# Patient Record
Sex: FEMALE | Race: WHITE | NOT HISPANIC OR LATINO | Employment: FULL TIME | URBAN - METROPOLITAN AREA
[De-identification: names, ages, dates, MRNs, and addresses within clinical notes are randomized per-mention and may not be internally consistent; named-entity substitution may affect disease eponyms.]

---

## 2017-01-03 LAB — EXTERNAL GROUP B STREP ANTIGEN: NEGATIVE

## 2017-01-04 ENCOUNTER — GENERIC CONVERSION - ENCOUNTER (OUTPATIENT)
Dept: OTHER | Facility: OTHER | Age: 27
End: 2017-01-04

## 2017-01-11 ENCOUNTER — GENERIC CONVERSION - ENCOUNTER (OUTPATIENT)
Dept: OTHER | Facility: OTHER | Age: 27
End: 2017-01-11

## 2017-01-11 ENCOUNTER — HOSPITAL ENCOUNTER (INPATIENT)
Facility: HOSPITAL | Age: 27
LOS: 4 days | Discharge: HOME | End: 2017-01-15
Attending: OBSTETRICS & GYNECOLOGY | Admitting: OBSTETRICS & GYNECOLOGY
Payer: COMMERCIAL

## 2017-01-11 DIAGNOSIS — O13.3 GESTATIONAL HTN, THIRD TRIMESTER: ICD-10-CM

## 2017-01-11 PROBLEM — Z3A.38 38 WEEKS GESTATION OF PREGNANCY: Status: ACTIVE | Noted: 2017-01-11

## 2017-01-11 PROBLEM — O13.9 GESTATIONAL HTN: Status: ACTIVE | Noted: 2017-01-11

## 2017-01-11 LAB
ABO GROUP BLD: NORMAL
ALBUMIN SERPL BCP-MCNC: 2.7 G/DL (ref 3.5–5)
ALP SERPL-CCNC: 195 U/L (ref 46–116)
ALT SERPL W P-5'-P-CCNC: 28 U/L (ref 12–78)
ANION GAP SERPL CALCULATED.3IONS-SCNC: 11 MMOL/L (ref 4–13)
AST SERPL W P-5'-P-CCNC: 20 U/L (ref 5–45)
BACTERIA UR QL AUTO: ABNORMAL /HPF
BASOPHILS # BLD AUTO: 0.02 THOUSANDS/ΜL (ref 0–0.1)
BASOPHILS NFR BLD AUTO: 0 % (ref 0–1)
BILIRUB SERPL-MCNC: 0.59 MG/DL (ref 0.2–1)
BILIRUB UR QL STRIP: NEGATIVE
BLD GP AB SCN SERPL QL: NEGATIVE
BUN SERPL-MCNC: 5 MG/DL (ref 5–25)
CALCIUM SERPL-MCNC: 8.7 MG/DL (ref 8.3–10.1)
CHLORIDE SERPL-SCNC: 103 MMOL/L (ref 100–108)
CLARITY UR: CLEAR
CO2 SERPL-SCNC: 23 MMOL/L (ref 21–32)
COLOR UR: YELLOW
CREAT SERPL-MCNC: 0.41 MG/DL (ref 0.6–1.3)
CREAT UR-MCNC: 61.2 MG/DL
EOSINOPHIL # BLD AUTO: 0.06 THOUSAND/ΜL (ref 0–0.61)
EOSINOPHIL NFR BLD AUTO: 0 % (ref 0–6)
ERYTHROCYTE [DISTWIDTH] IN BLOOD BY AUTOMATED COUNT: 13.4 % (ref 11.6–15.1)
GFR SERPL CREATININE-BSD FRML MDRD: >60 ML/MIN/1.73SQ M
GLUCOSE SERPL-MCNC: 80 MG/DL (ref 65–140)
GLUCOSE UR STRIP-MCNC: NEGATIVE MG/DL
HCT VFR BLD AUTO: 40.4 % (ref 34.8–46.1)
HGB BLD-MCNC: 14 G/DL (ref 11.5–15.4)
HGB UR QL STRIP.AUTO: NEGATIVE
KETONES UR STRIP-MCNC: NEGATIVE MG/DL
LEUKOCYTE ESTERASE UR QL STRIP: ABNORMAL
LYMPHOCYTES # BLD AUTO: 1.94 THOUSANDS/ΜL (ref 0.6–4.47)
LYMPHOCYTES NFR BLD AUTO: 14 % (ref 14–44)
MCH RBC QN AUTO: 29 PG (ref 26.8–34.3)
MCHC RBC AUTO-ENTMCNC: 34.7 G/DL (ref 31.4–37.4)
MCV RBC AUTO: 84 FL (ref 82–98)
MONOCYTES # BLD AUTO: 0.74 THOUSAND/ΜL (ref 0.17–1.22)
MONOCYTES NFR BLD AUTO: 5 % (ref 4–12)
NEUTROPHILS # BLD AUTO: 11.12 THOUSANDS/ΜL (ref 1.85–7.62)
NEUTS SEG NFR BLD AUTO: 81 % (ref 43–75)
NITRITE UR QL STRIP: NEGATIVE
NON-SQ EPI CELLS URNS QL MICRO: ABNORMAL /HPF
NRBC BLD AUTO-RTO: 0 /100 WBCS
PH UR STRIP.AUTO: 8 [PH] (ref 4.5–8)
PLATELET # BLD AUTO: 206 THOUSANDS/UL (ref 149–390)
PMV BLD AUTO: 10.3 FL (ref 8.9–12.7)
POTASSIUM SERPL-SCNC: 4.1 MMOL/L (ref 3.5–5.3)
PROT SERPL-MCNC: 6.7 G/DL (ref 6.4–8.2)
PROT UR STRIP-MCNC: NEGATIVE MG/DL
PROT UR-MCNC: 17 MG/DL
PROT/CREAT UR: 0.28 MG/G{CREAT} (ref 0–0.1)
RBC # BLD AUTO: 4.83 MILLION/UL (ref 3.81–5.12)
RBC #/AREA URNS AUTO: ABNORMAL /HPF
RH BLD: POSITIVE
SODIUM SERPL-SCNC: 137 MMOL/L (ref 136–145)
SP GR UR STRIP.AUTO: 1.02 (ref 1–1.03)
URATE SERPL-MCNC: 3.4 MG/DL (ref 2–6.8)
UROBILINOGEN UR QL STRIP.AUTO: 0.2 E.U./DL
WBC # BLD AUTO: 13.88 THOUSAND/UL (ref 4.31–10.16)
WBC #/AREA URNS AUTO: ABNORMAL /HPF

## 2017-01-11 PROCEDURE — 85025 COMPLETE CBC W/AUTO DIFF WBC: CPT | Performed by: OBSTETRICS & GYNECOLOGY

## 2017-01-11 PROCEDURE — 86900 BLOOD TYPING SEROLOGIC ABO: CPT | Performed by: OBSTETRICS & GYNECOLOGY

## 2017-01-11 PROCEDURE — 86850 RBC ANTIBODY SCREEN: CPT | Performed by: OBSTETRICS & GYNECOLOGY

## 2017-01-11 PROCEDURE — 82570 ASSAY OF URINE CREATININE: CPT | Performed by: OBSTETRICS & GYNECOLOGY

## 2017-01-11 PROCEDURE — 81001 URINALYSIS AUTO W/SCOPE: CPT | Performed by: OBSTETRICS & GYNECOLOGY

## 2017-01-11 PROCEDURE — 86592 SYPHILIS TEST NON-TREP QUAL: CPT | Performed by: OBSTETRICS & GYNECOLOGY

## 2017-01-11 PROCEDURE — 80053 COMPREHEN METABOLIC PANEL: CPT | Performed by: OBSTETRICS & GYNECOLOGY

## 2017-01-11 PROCEDURE — 3E033VJ INTRODUCTION OF OTHER HORMONE INTO PERIPHERAL VEIN, PERCUTANEOUS APPROACH: ICD-10-PCS | Performed by: OBSTETRICS & GYNECOLOGY

## 2017-01-11 PROCEDURE — 99203 OFFICE O/P NEW LOW 30 MIN: CPT

## 2017-01-11 PROCEDURE — 86901 BLOOD TYPING SEROLOGIC RH(D): CPT | Performed by: OBSTETRICS & GYNECOLOGY

## 2017-01-11 PROCEDURE — 4A1HXCZ MONITORING OF PRODUCTS OF CONCEPTION, CARDIAC RATE, EXTERNAL APPROACH: ICD-10-PCS | Performed by: OBSTETRICS & GYNECOLOGY

## 2017-01-11 PROCEDURE — 84550 ASSAY OF BLOOD/URIC ACID: CPT | Performed by: OBSTETRICS & GYNECOLOGY

## 2017-01-11 PROCEDURE — 84156 ASSAY OF PROTEIN URINE: CPT | Performed by: OBSTETRICS & GYNECOLOGY

## 2017-01-11 RX ORDER — OXYTOCIN/RINGER'S LACTATE 30/500 ML
1-30 PLASTIC BAG, INJECTION (ML) INTRAVENOUS
Status: DISCONTINUED | OUTPATIENT
Start: 2017-01-11 | End: 2017-01-13

## 2017-01-11 RX ADMIN — MISOPROSTOL 25 MCG: 100 TABLET ORAL at 15:13

## 2017-01-11 RX ADMIN — Medication 2 MILLI-UNITS/MIN: at 22:36

## 2017-01-12 LAB — RPR SER QL: NORMAL

## 2017-01-12 RX ORDER — BUTORPHANOL TARTRATE 1 MG/ML
1 INJECTION, SOLUTION INTRAMUSCULAR; INTRAVENOUS ONCE
Status: COMPLETED | OUTPATIENT
Start: 2017-01-12 | End: 2017-01-12

## 2017-01-12 RX ORDER — ACETAMINOPHEN 325 MG/1
650 TABLET ORAL EVERY 6 HOURS PRN
Status: DISCONTINUED | OUTPATIENT
Start: 2017-01-12 | End: 2017-01-13

## 2017-01-12 RX ORDER — SODIUM CHLORIDE, SODIUM LACTATE, POTASSIUM CHLORIDE, CALCIUM CHLORIDE 600; 310; 30; 20 MG/100ML; MG/100ML; MG/100ML; MG/100ML
125 INJECTION, SOLUTION INTRAVENOUS CONTINUOUS
Status: DISCONTINUED | OUTPATIENT
Start: 2017-01-12 | End: 2017-01-13

## 2017-01-12 RX ADMIN — ACETAMINOPHEN 650 MG: 325 TABLET, FILM COATED ORAL at 09:07

## 2017-01-12 RX ADMIN — SODIUM CHLORIDE, SODIUM LACTATE, POTASSIUM CHLORIDE, AND CALCIUM CHLORIDE 125 ML/HR: .6; .31; .03; .02 INJECTION, SOLUTION INTRAVENOUS at 03:40

## 2017-01-12 RX ADMIN — BUTORPHANOL TARTRATE 1 MG: 1 INJECTION, SOLUTION INTRAMUSCULAR; INTRAVENOUS at 14:32

## 2017-01-12 RX ADMIN — SODIUM CHLORIDE, SODIUM LACTATE, POTASSIUM CHLORIDE, AND CALCIUM CHLORIDE 125 ML/HR: .6; .31; .03; .02 INJECTION, SOLUTION INTRAVENOUS at 14:35

## 2017-01-12 RX ADMIN — SODIUM CHLORIDE, SODIUM LACTATE, POTASSIUM CHLORIDE, AND CALCIUM CHLORIDE 125 ML/HR: .6; .31; .03; .02 INJECTION, SOLUTION INTRAVENOUS at 06:25

## 2017-01-13 ENCOUNTER — ANESTHESIA EVENT (INPATIENT)
Dept: LABOR AND DELIVERY | Facility: HOSPITAL | Age: 27
End: 2017-01-13
Payer: COMMERCIAL

## 2017-01-13 ENCOUNTER — ANESTHESIA (INPATIENT)
Dept: LABOR AND DELIVERY | Facility: HOSPITAL | Age: 27
End: 2017-01-13
Payer: COMMERCIAL

## 2017-01-13 PROBLEM — Z3A.38 38 WEEKS GESTATION OF PREGNANCY: Status: RESOLVED | Noted: 2017-01-11 | Resolved: 2017-01-13

## 2017-01-13 LAB
BASE EXCESS BLDCOA CALC-SCNC: -8.5 MMOL/L (ref 3–11)
BASE EXCESS BLDCOV CALC-SCNC: -5.5 MMOL/L (ref 1–9)
HCO3 BLDCOA-SCNC: 19.1 MMOL/L (ref 17.3–27.3)
HCO3 BLDCOV-SCNC: 20.9 MMOL/L (ref 12.2–28.6)
O2 CT VFR BLDCOA CALC: 13.2 ML/DL
OXYHGB MFR BLDCOA: 59.2 %
OXYHGB MFR BLDCOV: 70.9 %
PCO2 BLDCOA: 46.7 MM[HG] (ref 30–60)
PCO2 BLDCOV: 44.1 MM HG (ref 27–43)
PH BLDCOA: 7.23 [PH] (ref 7.23–7.43)
PH BLDCOV: 7.29 [PH] (ref 7.19–7.49)
PO2 BLDCOA: 29 MM HG (ref 5–25)
PO2 BLDCOV: 32.9 MM HG (ref 15–45)
SAO2 % BLDCOV: 15.9 ML/DL

## 2017-01-13 PROCEDURE — 82805 BLOOD GASES W/O2 SATURATION: CPT | Performed by: OBSTETRICS & GYNECOLOGY

## 2017-01-13 PROCEDURE — 10907ZC DRAINAGE OF AMNIOTIC FLUID, THERAPEUTIC FROM PRODUCTS OF CONCEPTION, VIA NATURAL OR ARTIFICIAL OPENING: ICD-10-PCS | Performed by: OBSTETRICS & GYNECOLOGY

## 2017-01-13 PROCEDURE — 0KQM0ZZ REPAIR PERINEUM MUSCLE, OPEN APPROACH: ICD-10-PCS | Performed by: OBSTETRICS & GYNECOLOGY

## 2017-01-13 RX ORDER — SIMETHICONE 80 MG
80 TABLET,CHEWABLE ORAL 4 TIMES DAILY PRN
Status: DISCONTINUED | OUTPATIENT
Start: 2017-01-13 | End: 2017-01-15 | Stop reason: HOSPADM

## 2017-01-13 RX ORDER — BUTORPHANOL TARTRATE 1 MG/ML
1 INJECTION, SOLUTION INTRAMUSCULAR; INTRAVENOUS ONCE
Status: COMPLETED | OUTPATIENT
Start: 2017-01-13 | End: 2017-01-13

## 2017-01-13 RX ORDER — DOCUSATE SODIUM 100 MG/1
100 CAPSULE, LIQUID FILLED ORAL 2 TIMES DAILY
Status: DISCONTINUED | OUTPATIENT
Start: 2017-01-13 | End: 2017-01-15 | Stop reason: HOSPADM

## 2017-01-13 RX ORDER — PROMETHAZINE HYDROCHLORIDE 25 MG/ML
12.5 INJECTION, SOLUTION INTRAMUSCULAR; INTRAVENOUS ONCE
Status: COMPLETED | OUTPATIENT
Start: 2017-01-13 | End: 2017-01-13

## 2017-01-13 RX ORDER — OXYCODONE HYDROCHLORIDE AND ACETAMINOPHEN 5; 325 MG/1; MG/1
1 TABLET ORAL EVERY 4 HOURS PRN
Status: DISCONTINUED | OUTPATIENT
Start: 2017-01-13 | End: 2017-01-15 | Stop reason: HOSPADM

## 2017-01-13 RX ORDER — BISACODYL 10 MG
10 SUPPOSITORY, RECTAL RECTAL AS NEEDED
Status: DISCONTINUED | OUTPATIENT
Start: 2017-01-13 | End: 2017-01-15 | Stop reason: HOSPADM

## 2017-01-13 RX ORDER — IBUPROFEN 600 MG/1
600 TABLET ORAL EVERY 6 HOURS PRN
Status: DISCONTINUED | OUTPATIENT
Start: 2017-01-13 | End: 2017-01-15 | Stop reason: HOSPADM

## 2017-01-13 RX ORDER — ACETAMINOPHEN 325 MG/1
650 TABLET ORAL EVERY 4 HOURS PRN
Status: DISCONTINUED | OUTPATIENT
Start: 2017-01-13 | End: 2017-01-15 | Stop reason: HOSPADM

## 2017-01-13 RX ORDER — OXYCODONE HYDROCHLORIDE AND ACETAMINOPHEN 5; 325 MG/1; MG/1
2 TABLET ORAL EVERY 4 HOURS PRN
Status: DISCONTINUED | OUTPATIENT
Start: 2017-01-13 | End: 2017-01-15 | Stop reason: HOSPADM

## 2017-01-13 RX ORDER — OXYTOCIN/RINGER'S LACTATE 30/500 ML
250 PLASTIC BAG, INJECTION (ML) INTRAVENOUS CONTINUOUS
Status: ACTIVE | OUTPATIENT
Start: 2017-01-13 | End: 2017-01-13

## 2017-01-13 RX ORDER — DIPHENHYDRAMINE HCL 25 MG
25 TABLET ORAL EVERY 6 HOURS PRN
Status: DISCONTINUED | OUTPATIENT
Start: 2017-01-13 | End: 2017-01-15 | Stop reason: HOSPADM

## 2017-01-13 RX ORDER — DIAPER,BRIEF,INFANT-TODD,DISP
1 EACH MISCELLANEOUS AS NEEDED
Status: DISCONTINUED | OUTPATIENT
Start: 2017-01-13 | End: 2017-01-15 | Stop reason: HOSPADM

## 2017-01-13 RX ORDER — ROPIVACAINE HYDROCHLORIDE 2 MG/ML
INJECTION, SOLUTION EPIDURAL; INFILTRATION; PERINEURAL AS NEEDED
Status: DISCONTINUED | OUTPATIENT
Start: 2017-01-13 | End: 2017-01-13 | Stop reason: SURG

## 2017-01-13 RX ADMIN — WITCH HAZEL 1 PAD: 500 SOLUTION RECTAL; TOPICAL at 21:06

## 2017-01-13 RX ADMIN — BENZOCAINE AND MENTHOL: 20; .5 SPRAY TOPICAL at 21:06

## 2017-01-13 RX ADMIN — PROMETHAZINE HYDROCHLORIDE 12.5 MG: 25 INJECTION INTRAMUSCULAR; INTRAVENOUS at 11:15

## 2017-01-13 RX ADMIN — IBUPROFEN 600 MG: 600 TABLET, FILM COATED ORAL at 22:33

## 2017-01-13 RX ADMIN — BUTORPHANOL TARTRATE 1 MG: 1 INJECTION, SOLUTION INTRAMUSCULAR; INTRAVENOUS at 11:15

## 2017-01-13 RX ADMIN — SODIUM CHLORIDE, SODIUM LACTATE, POTASSIUM CHLORIDE, AND CALCIUM CHLORIDE 125 ML/HR: .6; .31; .03; .02 INJECTION, SOLUTION INTRAVENOUS at 07:40

## 2017-01-13 RX ADMIN — ROPIVACAINE HYDROCHLORIDE 5 ML: 2 INJECTION, SOLUTION EPIDURAL; INFILTRATION at 13:38

## 2017-01-13 RX ADMIN — Medication 250 MILLI-UNITS/MIN: at 17:28

## 2017-01-13 RX ADMIN — ROPIVACAINE HYDROCHLORIDE 5 ML: 2 INJECTION, SOLUTION EPIDURAL; INFILTRATION at 13:40

## 2017-01-13 RX ADMIN — Medication: at 13:30

## 2017-01-13 RX ADMIN — SODIUM CHLORIDE, SODIUM LACTATE, POTASSIUM CHLORIDE, AND CALCIUM CHLORIDE 999 ML/HR: .6; .31; .03; .02 INJECTION, SOLUTION INTRAVENOUS at 13:19

## 2017-01-14 RX ADMIN — IBUPROFEN 600 MG: 600 TABLET, FILM COATED ORAL at 05:56

## 2017-01-14 RX ADMIN — DOCUSATE SODIUM 100 MG: 100 CAPSULE, LIQUID FILLED ORAL at 18:21

## 2017-01-14 RX ADMIN — IBUPROFEN 600 MG: 600 TABLET, FILM COATED ORAL at 23:24

## 2017-01-14 RX ADMIN — IBUPROFEN 600 MG: 600 TABLET, FILM COATED ORAL at 12:31

## 2017-01-14 RX ADMIN — DOCUSATE SODIUM 100 MG: 100 CAPSULE, LIQUID FILLED ORAL at 12:31

## 2017-01-15 VITALS
HEART RATE: 95 BPM | BODY MASS INDEX: 34.16 KG/M2 | SYSTOLIC BLOOD PRESSURE: 126 MMHG | DIASTOLIC BLOOD PRESSURE: 92 MMHG | RESPIRATION RATE: 18 BRPM | WEIGHT: 174 LBS | HEIGHT: 60 IN | TEMPERATURE: 97.2 F

## 2017-01-15 PROCEDURE — 90707 MMR VACCINE SC: CPT | Performed by: SPECIALIST

## 2017-01-15 RX ORDER — ACETAMINOPHEN 325 MG/1
650 TABLET ORAL EVERY 4 HOURS PRN
Qty: 30 TABLET | Refills: 0
Start: 2017-01-15 | End: 2018-11-27

## 2017-01-15 RX ORDER — DIAPER,BRIEF,INFANT-TODD,DISP
1 EACH MISCELLANEOUS AS NEEDED
Qty: 30 G | Refills: 0
Start: 2017-01-15 | End: 2018-11-27

## 2017-01-15 RX ORDER — DOCUSATE SODIUM 100 MG/1
100 CAPSULE, LIQUID FILLED ORAL 2 TIMES DAILY
Qty: 60 CAPSULE | Refills: 0
Start: 2017-01-15 | End: 2018-11-27

## 2017-01-15 RX ORDER — IBUPROFEN 600 MG/1
600 TABLET ORAL EVERY 6 HOURS PRN
Qty: 30 TABLET | Refills: 0
Start: 2017-01-15 | End: 2018-11-27

## 2017-01-15 RX ADMIN — MEASLES, MUMPS, AND RUBELLA VIRUS VACCINE LIVE 0.5 ML: 1000; 12500; 1000 INJECTION, POWDER, LYOPHILIZED, FOR SUSPENSION SUBCUTANEOUS at 08:34

## 2017-01-15 RX ADMIN — DOCUSATE SODIUM 100 MG: 100 CAPSULE, LIQUID FILLED ORAL at 08:33

## 2017-01-23 LAB — PLACENTA IN STORAGE: NORMAL

## 2017-02-24 ENCOUNTER — GENERIC CONVERSION - ENCOUNTER (OUTPATIENT)
Dept: OTHER | Facility: OTHER | Age: 27
End: 2017-02-24

## 2017-02-24 ENCOUNTER — ALLSCRIPTS OFFICE VISIT (OUTPATIENT)
Dept: OTHER | Facility: OTHER | Age: 27
End: 2017-02-24

## 2017-05-31 ENCOUNTER — ALLSCRIPTS OFFICE VISIT (OUTPATIENT)
Dept: OTHER | Facility: OTHER | Age: 27
End: 2017-05-31

## 2018-01-09 NOTE — RESULT NOTES
Verified Results  Urine HCG- POC [id number] 09:12AM Pauline Etienne     Test Name Result Flag Reference   Urine HCG Positive         Plan  Suppression of menstruation    · Urine HCG- POC; Status:Complete;   Done: [id number] 09:12AM

## 2018-01-12 NOTE — RESULT NOTES
Verified Results  (Q) STEPWISE, PART 1 [id number] 08:36AM Minerva Álvarez     Test Name Result Flag Reference   INTERPRETATION SEE NOTE     This patient's risk does not exceed the first trimester  cut-off for Down syndrome or trisomy 18  The integrated  screen calculation is awaiting the second trimester sample  NT WAS USED IN THE RISK CALCULATIONS  Thank you for submitting this patient's Part 1 specimen  These first trimester values will be incorporated with the  second trimester values as part of the integrated testing  process  Please submit the Part 2 specimen between   08/02/2016-09/26/2016 (15 0 and 22 9 weeks gestation) with   08/02/2016-08/15/2016 (15 0 - 16 9 weeks gestation) being  optimal  When submitting Part 2, please include the  following Specimen # from Part 1:  [id number]   AGE RISK DOWN SYNDROME 1:670     DAIN DOWN SYNDROME RISK <1:5000  <1:50   RISK FOR TRISOMY 18 <1:5000  <1:100   CALCULATED GESTATIONAL$AGE 13 6     Crown rump length (CRL) was used to calculate gestational  age  MORRIS, if provided, was not used for gestational age  dating  SANCHEZ-A 740 ng/mL     SANCHEZ-A MOM 0 85     HCG 42 7 IU/mL     HCG MOM 0 57     NT MOM 1 35     The maternal serum screening results indicate a lower risk  of trisomy 21 in this pregnancy  The nasal bone was assessed  via ultrasound and was present  The combined risk is  therefore likely to be less than the calculated risk  Other  findings later in the pregnancy may change the risk  Nasal bone assessment is best accomplished through a fetal  ultrasound performed between 11 weeks 0 days through 13  weeks 6 days   In assessing the risk for aneuploidy, the  evaluation of the maternal serum markers plus the nuchal  thickness measurement is calculated first  Any potential  change to the patient's risk for aneuploidy depends on the  nuchal thickness, crown-rump length, and the ethnic origin,  and therefore the values generated by the algorithm itself  will not change  Additional information about the assessment  of the fetal nasal bone may be found on the [facility] website at  http://www  fetalmedicine com/fmf/training-certification/cert  wvztfmww-ht-tcanu  tenmartha/11-13-week-scan/assessment-of-the-nasal-bone/  Please note that the Sequential Integrated maternal serum  screen for Down syndrome risk assessment was designed by Dr Enedelia Richardson ([address], et al J Med Screen 2003 v10 p56-104)  to provide a high detection rate and low false positive rate  when a cutoff of 1:50 is used to identify high risk  pregnancies during the first trimester  Use of any other  cutoff for determination of risk in the first trimester will  result in a higher false positive rate for the two-part  screen  All patients whose risk is lower than 1:50 should  have a second sample submitted to complete the screen  This is a screening test, not a diagnostic test      This risk assessment is based on demographic data provided  by the ordering physician  Please notify the laboratory  promptly if any data are incorrect  If you have questions concerning this report: For clinical consultation, call [phone]; For technical questions, call [phone] ext [phone]; For recalculations, fax to [phone]  This test was developed and its analytical performance  characteristics have been determined by [facility]  It has not been  cleared or approved by FDA  This assay has been validated  pursuant to the CLIA regulations and is used for clinical  purposes  For additional information, please refer to  http://education  Popcuts/faq/FAQ85  (This link is provided for informational/educational  purposes only )   REFERRING PHYSICIAN NAME Rogelio Madisonsantinochad 984 PHONE [phone]     REFERRING PHYSICIAN NPMITCH [phone]     DATE OF BIRTH 1990     COLLECTION DATE 07/23/2016     MATERNAL WEIGHT 153 lbs     EST'D DATE OF DELIVERY 01/23/2017     MORRIS DETERMINED BY LMP     MOTHER'S ETHNIC ORIGIN      NUMBER OF FETUSES 1     INSULIN-DEPEND DIABETIC NO     REPEAT SPECIMEN NO     HX OF NEURAL TUBE DEFECTS NO     HISTORY OF DOWN SYNDROME NO     DONOR EGG NO     Donor Age: Egg Retrieval NOT GIVEN     ULTRASOUND DATE 07/21/2016     ULTRASONOGRAPHER'S NAME LEV JACOBS     NTQR ULTRASONOGRAPHER ID# [id number]     NTQR LOCATION ID# NOT GIVEN     NTQR READING PHYS ID# [id number]     [facility] ULTRASONOGRAPHER ID# NOT GIVEN     CROWN RUMP LENGTH 74 mm     NUCHAL TRANSLUCENCY 2 2 mm     Nasal Bone PRESENT     IF TWINS NOT GIVEN     TWIN B CRL NOT GIVEN mm     TWIN B NT NOT GIVEN mm     Twin B Nasal Bone NOT GIVEN

## 2018-01-13 VITALS
DIASTOLIC BLOOD PRESSURE: 78 MMHG | BODY MASS INDEX: 30.02 KG/M2 | HEIGHT: 61 IN | WEIGHT: 159 LBS | SYSTOLIC BLOOD PRESSURE: 120 MMHG

## 2018-01-13 NOTE — RESULT NOTES
Verified Results  (Q) STEPWISE, PART 2 [id number] 08:38AM Clay Álvarez Sat   REPORT COMMENT:  FASTING:NO     Test Name Result Flag Reference   INTERPRETATION SEE NOTE     SCREEN NEGATIVE FOR OPEN NTD, DOWN SYNDROME AND TRISOMY 18   NT WAS USED IN THE RISK CALCULATIONS  RISK FOR ONTD <1:5000     AGE RISK DOWN SYNDROME 1:900     DAIN DOWN SYNDROME RISK <1:5000  <1:270   RISK FOR TRISOMY 18 <1:5000  <1:100   CALCULATED GESTATIONAL$AGE 17 6     Crown rump length (CRL) was used to calculate gestational  age  MORRIS, if provided, was not used for gestational age  dating  AFP, SERUM 30 1 ng/mL     AFP MOM 0 76     Reference Range:                                        <2 50                                        IDD        <1 90                                        TWINS      <4 00                                        TWINS IDD  <3 50                                        TRIPLETS   <4 50   HCG, SERUM 16 4 IU/mL     HCG MOM 0 64     ESTRIOL, FREE 0 91 ng/mL     ESTRIOL MOM 0 75     INHIBIN A, DIMERIC 148 pg/mL     INHIBIN A MOM 0 91     SANCHEZ A 740 ng/mL     This test was performed using a kit that has not been  cleared or approved by the FDA  The analytical performance  characteristics of this test have been determined by [facility]  This test  should not be used for diagnosis without confirmation by  other medically established means  SANCHEZ-A MOM 0 85     NT MOM 1 35     The maternal serum screening results indicate a lower risk  of trisomy 21 in this pregnancy  The nasal bone was assessed  via ultrasound and was present  The combined risk is  therefore likely to be less than the calculated risk  Other  findings later in the pregnancy may change the risk  Nasal bone assessment is best accomplished through a fetal  ultrasound performed between 11 weeks 0 days through 13  weeks 6 days   In assessing the risk for aneuploidy, the  evaluation of the maternal serum markers plus the nuchal  thickness measurement is calculated first  Any potential  change to the patient's risk for aneuploidy depends on the  nuchal thickness, crown-rump length, and the ethnic origin,  and therefore the values generated by the algorithm itself  will not change  Additional information about the assessment  of the fetal nasal bone may be found on the [facility] website at  http://www  fetalmedicine "[employer]/fmf/training-certification/cert  ymftkekp-ty-uozvo  tence/11-13-week-scan/assessment-of-the-nasal-bone/  The Sequential Integrated Screen combines SANCHEZ-A and hCG  with or without a nuchal translucency measurement in the  first trimester with AFP, unconjugated estriol, intact hCG  and Inhibin A in the second trimester  This provides a  useful screening test for detection of open neural tube  defects, Down syndrome and Trisomy 18  It should be noted  that normal results can never guarantee the birth of a  normal baby and that 2 to 3 percent of newborns have some  type of physical or mental defect, many of which are  undetectable through any known prenatal diagnostic  technique  This is a screening test, not a diagnostic test      This risk assessment is based on demographic data provided  by the ordering physician  Please notify the laboratory  promptly if any data are incorrect  If you have questions concerning this report: For clinical consultation, call [phone]; For technical questions, call [phone] ext [phone]; For recalculations, fax to [phone]     REFERRING PHYSICIAN NAME Jojo Soriano     REFERRING PHYSICIAN PHONE [phone]     REFERRING PHYSICIAN ALONSO [phone]     SPECIMEN # FROM PART 1 [id number]     DATE OF BIRTH 1990     COLLECTION DATE 08/20/2016     MATERNAL WEIGHT 153 lbs     MORRIS: 01/23/2017     NUCHAL TRANSLUCENCY 2 2 mm     Prosper Rump Length 74 mm     Ultrasound Date 07/21/2016     Nasal Bone PRESENT     MOTHER'S ETHNIC ORIGIN  INSULIN DEPEND DIABETIC NO     REPEAT SPECIMEN NO     NUMBER OF FETUSES 1     HX OF NEURAL TUBE DEFECTS NO     Twin B Nasal Bone NOT GIVEN     For additional information, please refer to  http://CardCash.com/faq/FAQ37  (This link is provided for more informational/educational  purposes only )

## 2018-01-14 NOTE — PROCEDURES
Assessment    1  Encounter for fetal anatomic survey (V28 81) (Z36)    Active Problems     1  Dietary calcium deficiency (269 3) (E58)   2  Encounter for fetal anatomic survey (V28 81) (Z36)   3  Obesity (BMI 30 0-34 9) (278 00) (E66 9)    Encounter for supervision of normal first pregnancy in second trimester (V22 0) (Z34 02)       Obesity in pregnancy, antepartum, second trimester (649 13) (T51 536)          Current Meds    1  Calcium 500 MG TABS; Take 1 tablet daily; Therapy: (Recorded:40Uge6999) to Recorded   2  Multi Prenatal TABS; Therapy: (Recorded:97Hff7195) to Recorded    Allergies    1  No Known Drug Allergies    Results/Data  [id number] Abdominal Ultrasound OB Neri Lynn:   Procedure: 61517- Ultrasound pregnant uterus real time with image documentation, limited one or more fetuses  The study was done today in the office  Indication: EDC gestational age 19w2d weeks  Exam indication: Growth/Anatomy  Findings:   Amniotic fluid volume: 12 5cm  Fetal heart beat: 145bpm    Placental location: Posterior mid I (3 8cm from os)  Fetal position: Vertex  Impression: Anatomy=WNL  Male  58%=120oz    SB: There is a single intrauterine pregnancy measuring 20 weeks which is consistent with the patient's EGA of 20 weeks 1 day  The fetus is in cephalic presentation with a fHR of 145 bpm  The placenta is posterior in location and grade I in appearance  There is no placenta previa and the placental edge is 3 8 cm from the cervical os  The anatomy is normal except the LVOT, RVOT are not clearly visualized and while all 4 extremities are visualized, they are not seen in their entirety  Both adnexa are visualized and are normal size and free of cysts of masses  Rec repeat u/s for LVOT, RVOT and extremities to complete the anatomy scan    Head: the lateral ventricles, choroid plexus, cerebellum, cisterna magna, falx and CSP are all seen and wnl  Face: the lips/nares are visualized and there is no evidence of cleft lip  Chest: the 4 chamber heart is visualized and appears normal  The RVOT an LVOT are not clearly seen  BacK: the cervical, thoracic and sacral spine are visualized and appear normal  Abdomen: the stomach is left sided, bilateral kidneys appear normal and the bladder is midline  Extremities--not clearly seen in their entirety  Umbilical cord--3 vessel, both placental and fetal insertion sites appear normal  BPD: 4 69 cm (20 w 1d)  HC: 17 15 cm (19w 5 d)  AC: 14 61 cm (19 w 6d)  FL: 3 5 cm (21 weeks)  Future Appointments    Date/Time Provider Specialty Site   10/04/2016 05:00 PM MANUEL Hoffman   [address] [facility]     Signatures   Electronically signed by : Adán Brady, ; Sep  6 2016  1:25PM EST                       (Author)    Electronically signed by : MANUEL Plascencia ; Sep  9 2016  1:22PM EST                       (Author)

## 2018-01-16 NOTE — PROGRESS NOTES
Chief Complaint  PT presents to office for walk in HCG states LMP 04/18/16  PTs test was positive  PT is 5 weeks 4 days with an EDC of 01/22/16  PT is taking prenatals and was Given new OB packet  PT scheduled new OB visit  Active Problems   1  Cervical cancer screening (V76 2) (Z12 4)  2  Dietary calcium deficiency (269 3) (E58)  3  Encounter for genetic screening (V82 79) (Z13 79)  4  Encounter for gynecological examination without abnormal finding (V72 31) (Z01 419)  5  Obesity (BMI 30 0-34 9) (278 00) (E66 9)  6  Suppression of menstruation (626 8) (N94 89)    Current Meds  1  Multi Prenatal TABS; Therapy: (Recorded:14Apr2016) to Recorded    Allergies   1  No Known Drug Allergies    Vitals  Signs [Data Includes: Current Encounter]    Height: 5 ft 1 5 in  Weight: 156 lb   BMI Calculated: 29  BSA Calculated: 1 71  LMP: [id number]    Results/Data  Urine HCG- POC [id number] 09:12AM Quirino Olivares     Test Name Result Flag Reference   Urine HCG Positive         Assessment   1  Suppression of menstruation (626 8) (N94 89)    Plan  Suppression of menstruation    · Urine HCG- POC; Status:Complete;   Done: 61KFL4535 09:12AM    Future Appointments    Date/Time Provider Specialty Site   06/13/2016 08:15 AM MANUEL [facility]     Signatures   Electronically signed by : MANUEL Choudhury ; Jun 13 2016  7:57AM EST                       (Author)

## 2018-01-17 NOTE — MISCELLANEOUS
Message  left message on pt cell phone with Stepwise part 1 results-wnl  Part 2 instruction given TRF mailed  [facility] if questions  Active Problems    1  Dietary calcium deficiency (269 3) (E58)   2  Obesity (BMI 30 0-34 9) (278 00) (E66 9)   3  Obesity in pregnancy, antepartum, first trimester (649 13,278 00) (O99 211,E66 9)   4  Supervision of normal first pregnancy in first trimester (V22 0) (Z34 01)    Current Meds   1  Calcium 500 MG TABS; Take 1 tablet daily; Therapy: (Recorded:55Rbx7960) to Recorded   2  Multi Prenatal TABS; Therapy: (Recorded:32Ouk0935) to Recorded    Allergies    1   No Known Drug Allergies    Signatures   Electronically signed by : Braxton Carranza RN; Aug  3 2016  9:51AM EST                       (Author)

## 2018-01-18 NOTE — PROGRESS NOTES
2016         RE: Terisa Bones                                 To: Kassy MANUEL Kelley    MR#: [id number]                                  [address] [facility], [address]   : [address] [place] [address], [address] [place] [address],[address]   ENC: [id number]:HPSGL                             Fax: [phone]   (Exam #: [id number])      The LMP of this 30year old,  G1, P0-0-0-0 patient was 2016, giving   her an MORRIS of 2017 and a current gestational age of 17 weeks 3 days   by dates  A sonographic examination was performed on 2016 using   real time equipment  The ultrasound examination was performed using   abdominal technique  The patient has a BMI of 29 1  Her initial blood   pressure today was 143/89, and it was later recorded at 114/80  Earliest ultrasound found in her record: 16   MORRIS      Shorty Duran is on prenatal vitamins and denies any known drug allergies  She   denies any use of cigarettes, alcohol or illicit drugs  Her past medical   history significant for being rubella equivocal  Her surgical history   includes removal of wisdom teeth and she denies any first generation   family history of hypertension, diabetes, thrombosis or congenital   anomalies  Multiple longitudinal and transverse sections revealed a damon   intrauterine pregnancy with the fetus in vertex presentation  The placenta   is posterior in implantation, grade I in appearance, and there is no   placenta previa  Cardiac motion was observed at 154 bpm       INDICATIONS      first trimester genetic screening      Exam Types      Level I      RESULTS      Fetus # 1 of 1   Vertex presentation   Fetal growth appeared normal      MEASUREMENTS (* Included In Average GA)      CRL              7 4 cm        13 weeks 2 days*   Nuchal Trans    2 20 mm      THE AVERAGE GESTATIONAL AGE is 13 weeks 2 days +/- 7 days        UTERINE ARTERIES                                  S/D   PI RI    NOTCH       Left Uterine Artery        2 94  1 25  0 66       Right Uterine Artery       3 68  1 60  0 73      ANATOMY COMMENTS      Anatomic detail is limited at this gestational age  The yolk sac was not   noted  The fetal cranium appeared normal in shape and the nuchal   translucency was normal in size (2 2mm)  The nasal bone appears to be   present  The intracranial anatomy was unremarkable  Evaluation of the   spine revealed no obvious evidence for a neural tube defect  Anatomy of   the fetal thorax appeared within normal limits  The cardiac rhythm was   regular  Within the abdomen, stomach & bladder were visualized and the   abdominal wall appeared intact  A three vessel cord appears to be present  Active movement of the fetal body & extremities was seen  There is no   suspicion of a subchorionic bleed  The placental cord insertion was   normal    The uterine artery Dopplers are normal for this gestational age  There is no suspicion of a uterine myoma  Free fluid is not seen in the   posterior cul-de-sac  ADNEXA      The left ovary appeared normal and measured 2 0 x 1 4 x 1 5 cm with a   volume of 2 2 cc  The right ovary was not visualized  AMNIOTIC FLUID         Largest Vertical Pocket = 3 2 cm   Amniotic Fluid: Normal      IMPRESSION      [place] IUP   13 weeks and 2 days by this ultrasound  (MORRIS=JAN 24 2017)   Vertex presentation   Fetal growth appeared normal   Regular fetal heart rate of 154 bpm   Posterior placenta   No placenta previa      GENERAL COMMENT      As per your request, an ultrasound was performed on your patient for the   following indication: sequential screen      The patient was informed of the findings and counseled about the   limitations of the exam in detecting all forms of fetal congenital   abnormalities  She denies any vaginal bleeding or uterine cramping/contractions  Exam shows she is comfortable and her abdomen is non tender  Today's ultrasound findings and suggested follow-up were discussed in   detail with the patient  The Sequential Screen was discussed in detail,   including the sensitivities for detection of Down syndrome, Trisomy 18,   and open neural tube defects  The patient underwent fingerstick blood   collection for hCG and SANCHEZ-A to complete the initial component of the   Sequential Screen  Results should be available within one week  Follow up recommended:   1  Follow-up multiple marker serum screening at 16-18 weeks' gestation is   recommended to complete the Sequential Screen  2  Fetal Level II ultrasound imaging is recommended at 19-20 weeks'   gestation  AIMEE Vegas Asp, M D     Maternal-Fetal Medicine   Electronically signed 07/21/16 17:36

## 2018-01-18 NOTE — PROGRESS NOTES
SEP 30 2016         RE: Marc Scales                                 To: MANUEL Zimmer    MR#: [id number]                                  [address] [facility], [address]   : [address] [place] [address], [address] [place] [address],[address]   ENC: [id number]                             Fax: [phone]   (Exam #: [id number])      The LMP of this 30year old,  G1, P0-0-0-0 patient was 2016, giving   her an MORRIS of 2017 and a current gestational age of 20 weeks 4 days   by dates  A sonographic examination was performed on SEP 30 2016 using   real time equipment  The ultrasound examination was performed using   abdominal & vaginal techniques  The patient has a BMI of 31 2  Her initial   blood pressure today was 144/88, and it was later recorded at 115/75        Earliest ultrasound found in her record: 16   MORRIS      Cardiac motion was observed at 155 bpm       INDICATIONS      fetal anatomical survey   obesity      Exam Types      LEVEL II   Transvaginal      RESULTS      Fetus # 1 of 1   Vertex presentation   Fetal growth appeared normal   Placenta Location = Posterior   No placenta previa   Placenta Grade = 0      MEASUREMENTS (* Included In Average GA)      AC              18 4 cm        23 weeks 0 days* (37%)   BPD              5 6 cm        23 weeks 1 day * (36%)   HC              20 8 cm        22 weeks 6 days* (24%)   Femur            4 1 cm        23 weeks 4 days* (39%)      Humerus          3 9 cm        23 weeks 6 days  (47%)   Radius           3 4 cm        25 weeks 1 day   Ulna             3 5 cm        23 weeks 6 days   Tibia            3 8 cm        24 weeks 2 days  (58%)   Fibula           3 7 cm        23 weeks 0 days   Foot             4 3 cm        23 weeks 4 days      Cerebellum       2 7 cm        25 weeks 4 days   Biorbit          3 7 cm        23 weeks 1 day   CisternaMagna    4 8 mm      HC/AC           1 13   FL/AC           0 23   FL/BPD 0 74   EFW (Ac/Fl/Hc)   576 grams - 1 lbs 4 oz                 (34%)      THE AVERAGE GESTATIONAL AGE is 23 weeks 1 day +/- 14 days  AMNIOTIC FLUID         Largest Vertical Pocket = 4 5 cm   Amniotic Fluid: Normal      CERVICAL EVALUATION      SUPINE      Cervical Length: 3 80 cm      OTHER TEST RESULTS           Funneling?: No             Dynamic Changes?: No        Resp  To TFP?: No      ANATOMY      Head                                    Normal   Face/Neck                               Normal   Th  Cav  Normal   Heart                                   Normal   Abd  Cav  Normal   Stomach                                 Normal   Right Kidney                            Normal   Left Kidney                             Normal   Bladder                                 Normal   Abd  Wall                               Normal   Spine                                   Normal   Extrems                                 Normal   Genitalia                               Normal   Placenta                                Normal   Umbl  Cord                              Normal   Uterus                                  Normal   PCI                                     Normal      ANATOMY DETAILS      Visualized Appearing Sonographically Normal:   HEAD: (Calvarium, BPD Level, Cavum, Lateral Ventricles, Choroid Plexus,   Cerebellum, Cisterna Magna);    FACE/NECK: (Neck, Nuchal Fold, Profile,   Orbits, Nose/Lips, Palate, Face);    TH  CAV  : (Lungs, Diaphragm); HEART: (Four Chamber View, Proximal Left Outflow, Proximal Right Outflow,   3 Vessel Trachea, Short Axis of Greater Vessels, Ductal Arch, Aortic Arch,   Interventricular Septum, Interatrial Septum, IVC, SVC, Cardiac Axis,   Cardiac Position);    ABD  CAV : (Gall Bladder);    STOMACH, RIGHT KIDNEY,   LEFT KIDNEY, BLADDER, ABD   WALL, SPINE: (Cervical Spine, Thoracic Spine,   Lumbar Spine, Sacrum); EXTREMS: (Lt Humerus, Rt Humerus, Lt Forearm, Rt   Forearm, Lt Hand, Rt Hand, Lt Femur, Rt Femur, Lt Low Leg, Rt Low Leg, Lt   Foot, Rt Foot);    GENITALIA, PLACENTA, UMBL  CORD, UTERUS, PCI      ADNEXA      The left ovary appeared normal and measured 2 6 x 2 1 x 1 4 cm with a   volume of 4 0 cc  The right ovary appeared normal and measured 2 3 x 2 1 x   1 8 cm with a volume of 4 5 cc  IMPRESSION      [place] IUP   23 weeks and 1 day by this ultrasound  (MORRIS=JAN 26 2017)   Vertex presentation   Fetal growth appeared normal   Normal anatomy survey   Regular fetal heart rate of 155 bpm   Posterior placenta   No placenta previa      CONSULT COMMENT      OFFICE VISIT      On exam today the patient appears well, in no acute distress, and denies   any complaints  Her abdomen is non-tender  The patient had Stepwise Sequential Screening at our Center reported   through [employer]  Her risk for trisomy 21 before screening was   1:900, after screening her risk is <1:5000; her risk for Trisomy 25 was   <1:5000  The open neural tube defect risk after screening is <1:5000  The fetal anatomic survey is complete  There is no sonographic evidence   of fetal abnormalities at this time  Good fetal movement and tone are   seen  The amniotic fluid volume appears normal   The placenta is   posterior and it appears sonographically normal   A transvaginal   ultrasound was performed to assess the cervix, which was not seen well   transabdominally  The cervical length was 3 8 centimeters, which is   normal for the current gestational age  There was no significant   funneling or dynamic changes appreciated  The patient was informed of   today's findings and all of her questions were answered  The limitations   of ultrasound were reviewed with the patient, which she appears to   understand  The patient was counseled regarding and received a flu vaccine today    She   and her  also had questions regarding other routine pregnancy   capabilities and we discussed the safety of exercise in pregnancy, the   importance of wearing seat belts, and the importance of nutrition and   hydration  I asked her to further discuss any concerns with her primary   obstetrician  I recommend the patient return as clinically indicated  Please note, in addition to the time spent discussing the results of the   ultrasound, I spent approximately 10 minutes of face-to-face time with the   patient, greater than 50% of which was spent in counseling and the   coordination of care for this patient  Thank you very much for allowing us to participate in the care of this   very nice patient  Should you have any questions, please do not hesitate   to contact our office  AIMEE Gaming M D     Electronically signed 09/30/16 15:07

## 2018-01-18 NOTE — PROGRESS NOTES
Chief Complaint  Pt present for OB appointment at 26 weeks  Tdap given in left deltoid   LOT [id number]  [facility] [id number]  EXP 03/08/2019      Active Problems    1  Dietary calcium deficiency (269 3) (E58)   2  Encounter for prenatal care in third trimester of first pregnancy (V22 0) (Z34 03)   3  Need for Tdap vaccination (V06 1) (Z23)   4  Obesity (BMI 30 0-34 9) (278 00) (E66 9)   5  Rubella non-immune status, antepartum (646 83,V15 83) (O99 89,Z28 3)    Current Meds   1  Calcium 500 MG TABS; Take 1 tablet daily; Therapy: (Recorded:[id number]) to Recorded   2  Multi Prenatal TABS; Therapy: (Recorded:[id number]) to Recorded    Allergies    1  No Known Drug Allergies    2  No Known Environmental Allergies   3  No Known Food Allergies    Vitals  Signs    Systolic: 429  Diastolic: 64  Height: 5 ft 1 in  Weight: 166 lb   BMI Calculated: 31 37  BSA Calculated: 1 75  LMP: [id number]    Assessment    1  Rubella non-immune status, antepartum (646 83,V15 83) (O99 89,Z28 3)   · vaccinate post partum   2  Need for Tdap vaccination (V06 1) (Z23)    Plan  Need for Tdap vaccination    · Tdap (Boostrix)    Future Appointments    Date/Time Provider Specialty Site   12/12/2016 08:45 AM [facility]     Signatures   Electronically signed by : MANUEL Charles ; Nov 28 2016  9:07AM EST                       (Validation)

## 2018-01-22 VITALS — DIASTOLIC BLOOD PRESSURE: 96 MMHG | SYSTOLIC BLOOD PRESSURE: 138 MMHG

## 2018-01-22 VITALS
DIASTOLIC BLOOD PRESSURE: 90 MMHG | WEIGHT: 164 LBS | SYSTOLIC BLOOD PRESSURE: 132 MMHG | BODY MASS INDEX: 30.96 KG/M2 | HEIGHT: 61 IN

## 2018-01-22 VITALS
SYSTOLIC BLOOD PRESSURE: 138 MMHG | WEIGHT: 174 LBS | BODY MASS INDEX: 32.85 KG/M2 | DIASTOLIC BLOOD PRESSURE: 96 MMHG | HEIGHT: 61 IN

## 2018-01-22 VITALS
HEIGHT: 61 IN | BODY MASS INDEX: 32.47 KG/M2 | WEIGHT: 172 LBS | SYSTOLIC BLOOD PRESSURE: 132 MMHG | DIASTOLIC BLOOD PRESSURE: 74 MMHG

## 2018-03-07 NOTE — PROGRESS NOTES
Education  [employer] Education Post Partum:   Post Partum Education provided:   benefits of breastfeeding, importance of exclusive breastfeeding, exclusive breastfeeding for the first 6 months, importance of breastfeeding after 6 months following introduction of other foods, frequent feedings for optimal milk production, feeding on demand/baby-led feedings and effective positioning and attachment  The patient is breastfeeding  The patient is planning on exclusively breastfeeding until the baby is 10months of age  The patient is not formula feeding  The baby has seen a pediatrician   The pediatrician is not in the network Pediatrician name: [facility] Rasta Garza   Post partum education provided by: Radha Costa      Signatures   Electronically signed by : MANUEL Lai ; Feb 24 2017 12:19PM EST                       (Author)

## 2018-03-07 NOTE — PROGRESS NOTES
Education  [employer] Education 3rd Trimester: Third Trimester Education provided: benefits of breastfeeding, importance of exclusive breastfeeding, early initiation of breastfeeding, exclusive breastfeeding for the first 6 months, frequent feedings for optimal milk production, feeding on demand/baby-led feedings, effective positioning and attachment, importance of breastfeeding after 6 months following introduction of other foods, non-pharmacologic pain relief methods for labor and importance of early skin-to-skin contact  rooming-in on 24 hour basis Pregnancy Essentials Reference Guide given   The patient is planning on breastfeeding  The patient is planning on exclusively breastfeeding until the baby is 10months of age  Mother has registered for prenatal class  Thought has not been given to selecting a pediatrician  The mother has not discussed personal preferences with her provider  Prenatal education provided by: Mercedes Vickers Date: 11/28/2016        Signatures   Electronically signed by : MANUEL Interiano ; Nov 29 2016  7:05PM EST                       (Administrative)

## 2018-11-26 NOTE — MISCELLANEOUS
Message  Return to work or school:   Kallie Owens is under my professional care   She was seen in my office on 09/06/2016   She is able to return to work on  09/07/2016            Signatures   Electronically signed by : Kenn Yip, ; Sep  6 2016  2:05PM EST                       (Author) 26-Nov-2018

## 2018-11-27 ENCOUNTER — ANNUAL EXAM (OUTPATIENT)
Dept: OBGYN CLINIC | Facility: CLINIC | Age: 28
End: 2018-11-27
Payer: COMMERCIAL

## 2018-11-27 VITALS
HEART RATE: 97 BPM | BODY MASS INDEX: 30.42 KG/M2 | DIASTOLIC BLOOD PRESSURE: 72 MMHG | OXYGEN SATURATION: 99 % | WEIGHT: 161 LBS | SYSTOLIC BLOOD PRESSURE: 120 MMHG

## 2018-11-27 DIAGNOSIS — Z01.419 ENCOUNTER FOR ANNUAL ROUTINE GYNECOLOGICAL EXAMINATION: Primary | ICD-10-CM

## 2018-11-27 DIAGNOSIS — E58 DIETARY CALCIUM DEFICIENCY: ICD-10-CM

## 2018-11-27 DIAGNOSIS — E66.9 OBESITY (BMI 30.0-34.9): ICD-10-CM

## 2018-11-27 PROBLEM — O13.9 GESTATIONAL HTN: Status: RESOLVED | Noted: 2017-01-11 | Resolved: 2018-11-27

## 2018-11-27 PROBLEM — E66.811 OBESITY (BMI 30.0-34.9): Status: ACTIVE | Noted: 2018-11-27

## 2018-11-27 PROCEDURE — S0612 ANNUAL GYNECOLOGICAL EXAMINA: HCPCS | Performed by: OBSTETRICS & GYNECOLOGY

## 2019-05-10 ENCOUNTER — CLINICAL SUPPORT (OUTPATIENT)
Dept: OBGYN CLINIC | Facility: CLINIC | Age: 29
End: 2019-05-10
Payer: COMMERCIAL

## 2019-05-10 VITALS — WEIGHT: 160 LBS | HEIGHT: 61 IN | BODY MASS INDEX: 30.21 KG/M2

## 2019-05-10 DIAGNOSIS — Z32.00 POSSIBLE PREGNANCY: Primary | ICD-10-CM

## 2019-05-10 LAB — SL AMB POCT URINE HCG: POSITIVE

## 2019-05-10 PROCEDURE — 81025 URINE PREGNANCY TEST: CPT

## 2019-05-28 ENCOUNTER — INITIAL PRENATAL (OUTPATIENT)
Dept: OBGYN CLINIC | Facility: CLINIC | Age: 29
End: 2019-05-28

## 2019-05-28 VITALS
HEART RATE: 100 BPM | BODY MASS INDEX: 31.49 KG/M2 | DIASTOLIC BLOOD PRESSURE: 80 MMHG | SYSTOLIC BLOOD PRESSURE: 120 MMHG | WEIGHT: 160.4 LBS | OXYGEN SATURATION: 100 % | HEIGHT: 60 IN

## 2019-05-28 DIAGNOSIS — Z36.9 ANTENATAL SCREENING ENCOUNTER: ICD-10-CM

## 2019-05-28 DIAGNOSIS — Z34.81 SUPERVISION OF NORMAL INTRAUTERINE PREGNANCY IN MULTIGRAVIDA, FIRST TRIMESTER: Primary | ICD-10-CM

## 2019-05-28 PROCEDURE — OBC: Performed by: NURSE PRACTITIONER

## 2019-05-29 LAB
EXTERNAL ABO GROUPING: NORMAL
EXTERNAL ANTIBODY SCREEN: NORMAL
EXTERNAL HEMATOCRIT: 37.5 %
EXTERNAL HEMOGLOBIN: 13.2 G/DL
EXTERNAL HEPATITIS B SURFACE ANTIGEN: NEGATIVE
EXTERNAL HIV-1 ANTIBODY: NEGATIVE
EXTERNAL PLATELET COUNT: 299 K/ΜL
EXTERNAL RH FACTOR: POSITIVE
EXTERNAL RUBELLA IGG QUANTITATION: NORMAL
EXTERNAL SYPHILIS RPR SCREEN: NORMAL

## 2019-06-03 ENCOUNTER — OB ABSTRACT (OUTPATIENT)
Dept: OBGYN CLINIC | Facility: CLINIC | Age: 29
End: 2019-06-03

## 2019-06-19 ENCOUNTER — ROUTINE PRENATAL (OUTPATIENT)
Dept: OBGYN CLINIC | Facility: CLINIC | Age: 29
End: 2019-06-19

## 2019-06-19 VITALS
DIASTOLIC BLOOD PRESSURE: 78 MMHG | HEART RATE: 96 BPM | SYSTOLIC BLOOD PRESSURE: 122 MMHG | BODY MASS INDEX: 30.86 KG/M2 | OXYGEN SATURATION: 98 % | WEIGHT: 158 LBS

## 2019-06-19 DIAGNOSIS — Z12.4 PAP SMEAR FOR CERVICAL CANCER SCREENING: ICD-10-CM

## 2019-06-19 DIAGNOSIS — O09.291 HISTORY OF PRE-ECLAMPSIA IN PRIOR PREGNANCY, CURRENTLY PREGNANT IN FIRST TRIMESTER: Primary | ICD-10-CM

## 2019-06-19 PROCEDURE — PNV: Performed by: OBSTETRICS & GYNECOLOGY

## 2019-06-19 RX ORDER — ASPIRIN 81 MG/1
162 TABLET ORAL DAILY
Qty: 60 TABLET | Refills: 10 | Status: SHIPPED | OUTPATIENT
Start: 2019-06-19 | End: 2019-12-28 | Stop reason: HOSPADM

## 2019-06-20 LAB
EXTERNAL CHLAMYDIA SCREEN: NEGATIVE
EXTERNAL GONORRHEA SCREEN: NEGATIVE

## 2019-06-24 ENCOUNTER — OB ABSTRACT (OUTPATIENT)
Dept: OBGYN CLINIC | Facility: CLINIC | Age: 29
End: 2019-06-24

## 2019-06-27 ENCOUNTER — ROUTINE PRENATAL (OUTPATIENT)
Dept: PERINATAL CARE | Facility: OTHER | Age: 29
End: 2019-06-27
Payer: COMMERCIAL

## 2019-06-27 VITALS
DIASTOLIC BLOOD PRESSURE: 82 MMHG | BODY MASS INDEX: 31.37 KG/M2 | WEIGHT: 159.8 LBS | SYSTOLIC BLOOD PRESSURE: 123 MMHG | HEIGHT: 60 IN | HEART RATE: 102 BPM

## 2019-06-27 DIAGNOSIS — Z36.9 ANTENATAL SCREENING ENCOUNTER: ICD-10-CM

## 2019-06-27 DIAGNOSIS — O09.291 HISTORY OF PRE-ECLAMPSIA IN PRIOR PREGNANCY, CURRENTLY PREGNANT IN FIRST TRIMESTER: ICD-10-CM

## 2019-06-27 DIAGNOSIS — Z3A.12 12 WEEKS GESTATION OF PREGNANCY: ICD-10-CM

## 2019-06-27 DIAGNOSIS — Z36.82 ENCOUNTER FOR ANTENATAL SCREENING FOR NUCHAL TRANSLUCENCY: Primary | ICD-10-CM

## 2019-06-27 DIAGNOSIS — Z34.81 SUPERVISION OF NORMAL INTRAUTERINE PREGNANCY IN MULTIGRAVIDA, FIRST TRIMESTER: ICD-10-CM

## 2019-06-27 PROCEDURE — 76813 OB US NUCHAL MEAS 1 GEST: CPT | Performed by: OBSTETRICS & GYNECOLOGY

## 2019-06-27 PROCEDURE — 99212 OFFICE O/P EST SF 10 MIN: CPT | Performed by: OBSTETRICS & GYNECOLOGY

## 2019-07-02 LAB
# FETUSES US: 1
AGE: NORMAL
B-HCG ADJ MOM SERPL: 1.59
B-HCG SERPL-ACNC: 134.9 IU/ML
COLLECT DATE: NORMAL
CURRENT SMOKER: NO
DONATED EGG PATIENT QL: NO
FET CRL US.MEAS: 59 MM
FET CRL US.MEAS: NORMAL MM
FET NUCHAL FOLD MOM THICKNESS US.MEAS: 1.1
FET NUCHAL FOLD THICKNESS US.MEAS: 1.5 MM
FET NUCHAL FOLD THICKNESS US.MEAS: NORMAL MM
FET TS 21 RISK FROM MAT AGE: NORMAL
GA CLIN EST: 12.3 WK
GA METHOD: NORMAL
HX OF NTD NARR: NO
HX OF TRISOMY 21 NARR: NO
IDDM PATIENT QL: NO
INTEGRATED SCN PATIENT-IMP: NORMAL
PAPP-A MOM SERPL: 0.51
PAPP-A SERPL-MCNC: 379.4 NG/ML
PHYSICIAN NPI: NORMAL
SL AMB NASAL BONE: PRESENT
SL AMB NTQR LOCATION ID#: NORMAL
SL AMB NTQR READING PHYS ID#: NORMAL
SL AMB REFERRING PHYSICIAN NAME: NORMAL
SL AMB REFERRING PHYSICIAN PHONE: NORMAL
SL AMB REPEAT SPECIMEN: NO
SL AMB TWIN B NASAL BONE: NORMAL
SONOGRAPHER NAME: NORMAL
SONOGRAPHER: NORMAL
SONOGRAPHER: NORMAL
TS 18 RISK FETUS: NORMAL
TS 21 RISK FETUS: NORMAL
US DATE: NORMAL
US FETUSES STUDY [IMP]: NORMAL

## 2019-07-03 ENCOUNTER — TELEPHONE (OUTPATIENT)
Dept: PERINATAL CARE | Facility: CLINIC | Age: 29
End: 2019-07-03

## 2019-07-03 NOTE — TELEPHONE ENCOUNTER
----- Message from Isabella Britt MD sent at 7/3/2019  9:32 AM EDT -----  I reviewed the lab study today and the results are normal

## 2019-07-03 NOTE — TELEPHONE ENCOUNTER
Left VMM on # provided on communication consent with results of the part 1 Stepwise Sequential Screen  Part 2 explained, pt instructed to contact office with questions  TRF mailed

## 2019-07-03 NOTE — LETTER
07/03/19  Megan Gandhi Payamjhoan  1990    Thank you for completing Part 1 of your Sequential Screen  To obtain a complete test result, please complete blood work for Part 2 Sequential Screen between the weeks of 7/16/2019 to 7/29/2019 (optimal) however you have until 9/9/2019  Based on your insurance coverage, please use one of the following locations  The other option is to go to [email or web address]  Call our office for any questions at [phone]          Sincerely,    Toshia Duggan RN

## 2019-07-18 ENCOUNTER — ROUTINE PRENATAL (OUTPATIENT)
Dept: OBGYN CLINIC | Facility: CLINIC | Age: 29
End: 2019-07-18

## 2019-07-18 VITALS
OXYGEN SATURATION: 100 % | BODY MASS INDEX: 30.86 KG/M2 | WEIGHT: 158 LBS | DIASTOLIC BLOOD PRESSURE: 70 MMHG | SYSTOLIC BLOOD PRESSURE: 124 MMHG | HEART RATE: 84 BPM

## 2019-07-18 DIAGNOSIS — O09.292 HISTORY OF PRE-ECLAMPSIA IN PRIOR PREGNANCY, CURRENTLY PREGNANT IN SECOND TRIMESTER: Primary | ICD-10-CM

## 2019-07-18 PROCEDURE — PNV: Performed by: OBSTETRICS & GYNECOLOGY

## 2019-07-18 NOTE — PROGRESS NOTES
Pt is a 25 y o   15w4d  Pregnancy is complicated by History of Preeclampsia  Pt reports +FM  Denies vb, lof, ctx  PTL precautions reviewed  Baseline PIH labs reviewed with patient  Had part 1 of sequential screen completed  Doing part 2 today  Has level II u/s scheduled  F/U 4 weeks

## 2019-07-20 LAB
# FETUSES US: 1
AFP ADJ MOM SERPL: 0.59
AFP SERPL-MCNC: 16.8 NG/ML
B-HCG ADJ MOM SERPL: 1.53
B-HCG SERPL-ACNC: 60.2 IU/ML
COLLECT DATE: ABNORMAL
CURRENT SMOKER: NO
FET CRL US.MEAS: 59 MM
FET NUCHAL FOLD MOM THICKNESS US.MEAS: 1.1
FET NUCHAL FOLD THICKNESS US.MEAS: 1.5 MM
FET TS 21 RISK FROM MAT AGE: ABNORMAL
GA CLIN EST: 15.3 WK
HX OF NTD NARR: NO
IDDM PATIENT QL: NO
INHIBIN A ADJ MOM SERPL: 2.53
INHIBIN A SERPL-MCNC: 440 PG/ML
INTEGRATED SCN PATIENT-IMP: ABNORMAL
NEURAL TUBE DEFECT RISK FETUS: ABNORMAL %
PAPP-A MOM SERPL: 0.51
PAPP-A SERPL-MCNC: 379.4 NG/ML
PHYSICIAN NPI: ABNORMAL
SL AMB NASAL BONE: PRESENT
SL AMB REFERRING PHYSICIAN NAME: ABNORMAL
SL AMB REFERRING PHYSICIAN PHONE: ABNORMAL
SL AMB REPEAT SPECIMEN: NO
SL AMB SPECIMEN # FROM PART 1: ABNORMAL
SL AMB TWIN B NASAL BONE: ABNORMAL
TS 18 RISK FETUS: ABNORMAL
TS 21 RISK FETUS: ABNORMAL
U ESTRIOL ADJ MOM SERPL: 0.67
U ESTRIOL SERPL-MCNC: 0.41 NG/ML
US DATE: ABNORMAL

## 2019-07-21 NOTE — RESULT ENCOUNTER NOTE
Increased risk for Down syndrome  Please have Ashley or Dionna call patient to review result and schedule appointment for genetic counseling

## 2019-07-22 ENCOUNTER — TELEPHONE (OUTPATIENT)
Dept: PERINATAL CARE | Facility: CLINIC | Age: 29
End: 2019-07-22

## 2019-07-22 NOTE — TELEPHONE ENCOUNTER
Received patient's part 2 sequential results which were screen positive for Down syndrome  Called patient and left message for her to return phone call

## 2019-07-22 NOTE — TELEPHONE ENCOUNTER
Received return phone call from patient  Confirm date of birth  Informed patient of her stepwise part 2 test results which were screen positive for Down syndrome  Offered patient genetic counseling to discuss further testing options  Patient is scheduled for tomorrow July 23, 2019 at 8 o'clock  Patient expressed that she has no additional questions at this time

## 2019-07-23 ENCOUNTER — OFFICE VISIT (OUTPATIENT)
Dept: PERINATAL CARE | Facility: CLINIC | Age: 29
End: 2019-07-23

## 2019-07-23 VITALS
BODY MASS INDEX: 31.14 KG/M2 | SYSTOLIC BLOOD PRESSURE: 128 MMHG | DIASTOLIC BLOOD PRESSURE: 83 MMHG | WEIGHT: 158.6 LBS | HEIGHT: 60 IN

## 2019-07-23 DIAGNOSIS — O09.292 HISTORY OF PRE-ECLAMPSIA IN PRIOR PREGNANCY, CURRENTLY PREGNANT IN SECOND TRIMESTER: ICD-10-CM

## 2019-07-23 DIAGNOSIS — O28.1 ABNORMAL BIOCHEMICAL FINDING ON ANTENATAL SCREENING OF MOTHER: Primary | ICD-10-CM

## 2019-07-23 NOTE — PROGRESS NOTES
Genetic Counseling   High-Risk Gestation Note    Appointment Date:  2019  Referred By: Maurisio Martinez MD  YOB: 1990  Partner:  Beatriz Chase     Indication for Visit:  abnormal serum screen    Pregnancy History:   Estimated Date of Delivery: 20  Estimated Gestational Age: 16w2d     Genetic Counseling: Mini Schaefer is a 25 y o  female who is here to discuss the abnormal Sequential screen part 2 result with an increased risk for Down syndrome  Issues Discussed:  average population risk- 3-4% in the average pregnancy of serious condition or birth defect; 2-3% risk of mental retardation; not all detected by prenatal testing  Chromosomal: non-disjunction- 1/42 risk for Down syndrome based on Sequential screen part 2 results  Options Discussed:  Amniocentesis-risks and limitations discussed  Ethnic screening discussed-clinical and genetic basis of CF, SMA, Fragile X syndrome  Variability and treatment addressed  Level II ultrasound to screen for structural anomalies  Cell free fetal DNA testing  Additional Information / Impression:  Mini Schaefer is a 25 y o  female who presented for genetic counseling to discuss the abnormal Sequential screen part 2 test result  The test found the pregnancy at an increased risk of 1/42 for Trisomy 21  This is elevated over the patient's second trimester age related risk of 1/670  The patient's test results were screen negative for Trisomy 18 and open neural tube defects with modified risks of 1/5,000 and 1/5,000 respectively  The diagnosis and prognosis of Trisomy 21 was reviewed with the patient  The benefits and limitations of maternal serum screening were also discussed  The risks, benefits, and limitations of amniocentesis were discussed with the patient  Amniocentesis is performed under direct real time ultrasound visualization to avoid both the fetus and the placenta    Once amniotic fluid is withdrawn, laboratory analysis is performed and amniotic fluid alpha-fetoprotein, as well as chromosome analysis is undertaken  The risk of genetic amniocentesis includes, but is not limited to less than 1 in 300 pregnancy loss rate or  delivery rate if 23 weeks or greater, infection, bleeding, rupture of membranes, failure of cells to grow, karyotype error, laboratory error, etc   Occasionally a repeat amniocentesis is necessary due to cell culture failure  Chromosome analysis from amniocentesis is 99 9% accurate and alpha-fetoprotein analysis can detect approximately 95% of open neural tube defects  We reviewed the testing option of cell free fetal DNA screening (also known as noninvasive prenatal testing or NIPT)  We discussed that it is a serum test to identify fragments of fetal DNA in maternal blood  We reviewed the benefits and limitations of cell free fetal DNA screening in detecting Down syndrome, Trisomy 13, Trisomy 25 and sex chromosome aneuploidies  We also discussed that cell free fetal DNA screening does not detect additional chromosomal abnormalities and the possibility of a failed test result  We reviewed that level II anatomy ultrasound is typically performed at approximately 20 weeks gestation  Level II ultrasound evaluation is between 60-80% accurate in detecting major physical birth defects and variations in fetal development that may be associated with chromosome abnormalities  Level II ultrasound evaluation is not able to detect all birth defects or health problems  After discussing the available prenatal screening and testing options Ruthann elected to pursue cell free fetal DNA screening  A [facility] lab slip was provided to the patient to be drawn at the lab pending insurance approval   Results take approximately 7-10 days  The patient declined amniocentesis secondary to procedural related complications    She may reconsider diagnostic testing should the cell free fetal DNA screening come back abnormal   Pat Genre is also planning on pursuing Level II ultrasound at the appropriate time  Of note, the Inhibin A was elevated at 2 53 MoM and growth ultrasound in the third trimester is recommended per [facility] protocol  During the session histories were taken on the patient and her partner's families  Kary Robins states that her father had colon cancer (age of onset: 62) but is currently in remission  Further review of family history for the patient and her partner was noncontributory  The family history was not significant for genetic diseases or disorders, intellectual disability, birth defects, fetal loss, or consanguinity  Patient and her partner both report being of [facility] decent  She denies either of them having known Ashkenazi Muslim ancestry  The benefits and limitations of Cystic fibrosis (CF), Spinal muscular atrophy (SMA), and Fragile X carrier screening was discussed  Kary Robins stated that she had cystic fibrosis carrier screening previously performed and was negative  The patient elected to pursue SMA and Fragile X carrier screening pending insurance approval   Thor Smoke for SMA and Fragile X was provided to the patient and she will be contacted for her blood draw once approval is obtained  Lastly, we discussed the fact that everyone in the general population regardless of age, family history, or medical background has approximately a 3-5% risk of having a child with some type of congenital anomaly, genetic disease or intellectual disability  Currently there are no tests available to rule out all birth defects or health problems  Kary Robins was provided with our contact information  I encouraged her to call with any questions or concerns  Time spent with Genetic Counselor: 45 minutes    Plan / Tests Ordered:  1) Patient declined amniocentesis    2) Patient elected NIPT (QNatal), SMA, and Fragile X carrier screening pending insurance authorization - labslips provided to pateint  3) Level II ultrasound at approximately 20 weeks gestation  4) Growth ultrasound in third trimester

## 2019-07-24 ENCOUNTER — TELEPHONE (OUTPATIENT)
Dept: PERINATAL CARE | Facility: CLINIC | Age: 29
End: 2019-07-24

## 2019-07-24 NOTE — TELEPHONE ENCOUNTER
Patient's  Sandra Thurston) called asking if approval was obtained yet for the screening bloodwork  Informed him that insurance approved NIPT and SMA (Authorization [id number])  Fragile X was denied, thus the couple elected to decline the screening  Gaurav Rdz stated his wife will go for her blood draw at [address] [place] [address] on Thursday  Informed him that we will contact Ruthann when results are available

## 2019-07-25 ENCOUNTER — TELEPHONE (OUTPATIENT)
Dept: PERINATAL CARE | Facility: CLINIC | Age: 29
End: 2019-07-25

## 2019-07-25 NOTE — TELEPHONE ENCOUNTER
received phone call from someone at [address] [place] [address] regarding patient's NIPT blood draw  Call back number provided was not answered  Called patient and asked if there were any issues  Patient stated that [address] [place] [address] comes to her work where she went to have her blood drawn, but they did not have the streck tubes needed for the [facility]   Patient will go to an actual [facility] draw site tomorrow  Asked that she call me directly if there are any problems

## 2019-08-08 LAB
# FETUSES US: 1
CFDNA.FET/TOTAL PLAS.CFDNA: NORMAL
FET CHR 13 TS PLAS.CFDNA QL: NEGATIVE
FET CHR 18 TS PLAS.CFDNA QL: NEGATIVE
FET CHR 21 TS PLAS.CFDNA QL: NEGATIVE
FET CHR X + Y ANEUP PLAS.CFDNA QL: NORMAL
FET CHROM X + Y ANEUP CFDNA IMP: NORMAL
FET Y CHROM PLAS.CFDNA QL: DETECTED
FET Y CHROM PLAS.CFDNA: NORMAL
GA (DAYS): 2 D
GA (WEEKS): 17 WK
MICRODELETION SYND BLD/T FISH: NORMAL
REF LAB TEST METHOD: NORMAL
SERVICE CMNT-IMP: NORMAL
SERVICE CMNT-IMP: NORMAL
SL AMB ABNORMAL MSS?: YES
SL AMB ABNORMAL US?: NORMAL
SL AMB ADVANCED MATERNAL AGE?: NORMAL
SL AMB MICRODELETION INTERP: NORMAL
SL AMB MICRODELETION: NORMAL
SL AMB PERSONAL/FAM HISTORY?: NORMAL
SL AMB SPECIFICATIONS: NORMAL

## 2019-08-15 ENCOUNTER — ROUTINE PRENATAL (OUTPATIENT)
Dept: OBGYN CLINIC | Facility: CLINIC | Age: 29
End: 2019-08-15

## 2019-08-15 VITALS
HEART RATE: 93 BPM | DIASTOLIC BLOOD PRESSURE: 76 MMHG | OXYGEN SATURATION: 98 % | WEIGHT: 160.8 LBS | BODY MASS INDEX: 31.4 KG/M2 | SYSTOLIC BLOOD PRESSURE: 124 MMHG

## 2019-08-15 DIAGNOSIS — Z34.82 SUPERVISION OF NORMAL INTRAUTERINE PREGNANCY IN MULTIGRAVIDA, SECOND TRIMESTER: Primary | ICD-10-CM

## 2019-08-15 PROCEDURE — PNV: Performed by: NURSE PRACTITIONER

## 2019-08-15 NOTE — PROGRESS NOTES
26 yo   at 19w 4d gestation  hx of pre-eclampsia    + screen for DS on SS part 2; Qnatal negative  She is aware of gender, which is a boy  Has level 2 US next week  Taking  mg daily  She is feeling baby's movement  Denies LOF, VB or pain      S=D, normal FHTs, normal BP    RV 4 wks

## 2019-08-22 ENCOUNTER — ROUTINE PRENATAL (OUTPATIENT)
Dept: PERINATAL CARE | Facility: OTHER | Age: 29
End: 2019-08-22
Payer: COMMERCIAL

## 2019-08-22 VITALS
DIASTOLIC BLOOD PRESSURE: 86 MMHG | WEIGHT: 161.6 LBS | HEART RATE: 106 BPM | BODY MASS INDEX: 31.73 KG/M2 | SYSTOLIC BLOOD PRESSURE: 124 MMHG | HEIGHT: 60 IN

## 2019-08-22 DIAGNOSIS — Z36.86 ENCOUNTER FOR ANTENATAL SCREENING FOR CERVICAL LENGTH: ICD-10-CM

## 2019-08-22 DIAGNOSIS — O28.5 ABNORMAL GENETIC TEST DURING PREGNANCY: Primary | ICD-10-CM

## 2019-08-22 DIAGNOSIS — O28.8 HIGH RISK PREGNANCY WITH HIGH INHIBIN: ICD-10-CM

## 2019-08-22 DIAGNOSIS — O09.899 HIGH RISK PREGNANCY WITH HIGH INHIBIN: ICD-10-CM

## 2019-08-22 DIAGNOSIS — O99.212 MATERNAL OBESITY, ANTEPARTUM, SECOND TRIMESTER: ICD-10-CM

## 2019-08-22 DIAGNOSIS — O09.292 HISTORY OF PRE-ECLAMPSIA IN PRIOR PREGNANCY, CURRENTLY PREGNANT IN SECOND TRIMESTER: ICD-10-CM

## 2019-08-22 DIAGNOSIS — Z3A.20 20 WEEKS GESTATION OF PREGNANCY: ICD-10-CM

## 2019-08-22 PROCEDURE — 99212 OFFICE O/P EST SF 10 MIN: CPT | Performed by: OBSTETRICS & GYNECOLOGY

## 2019-08-22 PROCEDURE — 76811 OB US DETAILED SNGL FETUS: CPT | Performed by: OBSTETRICS & GYNECOLOGY

## 2019-08-22 PROCEDURE — 76817 TRANSVAGINAL US OBSTETRIC: CPT | Performed by: OBSTETRICS & GYNECOLOGY

## 2019-08-22 NOTE — PROGRESS NOTES
Please refer to the [facility] ultrasound report in Ob Procedures for additional information regarding the visit to the [facility]  today

## 2019-08-22 NOTE — LETTER
August 22, 2019     Howie Loaiza, [address] [facility] [address]  [address] [facility]    Patient: Elaine España   YOB: 1990   Date of Visit: 8/22/2019       Dear Dr Rhonda De Santiago: Thank you for referring Kiya Marshall to me for evaluation  Below are my notes for this consultation  If you have questions, please do not hesitate to call me  I look forward to following your patient along with you           Sincerely,        Delaney Fairbanks MD        CC: No Recipients

## 2019-09-12 ENCOUNTER — ROUTINE PRENATAL (OUTPATIENT)
Dept: OBGYN CLINIC | Facility: CLINIC | Age: 29
End: 2019-09-12

## 2019-09-12 VITALS
DIASTOLIC BLOOD PRESSURE: 78 MMHG | SYSTOLIC BLOOD PRESSURE: 120 MMHG | HEART RATE: 106 BPM | WEIGHT: 163.2 LBS | BODY MASS INDEX: 31.87 KG/M2 | OXYGEN SATURATION: 99 %

## 2019-09-12 DIAGNOSIS — O09.292 HISTORY OF PRE-ECLAMPSIA IN PRIOR PREGNANCY, CURRENTLY PREGNANT IN SECOND TRIMESTER: ICD-10-CM

## 2019-09-12 DIAGNOSIS — O09.899 HIGH RISK PREGNANCY WITH HIGH INHIBIN: ICD-10-CM

## 2019-09-12 DIAGNOSIS — O99.212 MATERNAL OBESITY, ANTEPARTUM, SECOND TRIMESTER: ICD-10-CM

## 2019-09-12 DIAGNOSIS — O28.5 ABNORMAL GENETIC TEST DURING PREGNANCY: Primary | ICD-10-CM

## 2019-09-12 DIAGNOSIS — O28.8 HIGH RISK PREGNANCY WITH HIGH INHIBIN: ICD-10-CM

## 2019-09-12 PROCEDURE — PNV: Performed by: OBSTETRICS & GYNECOLOGY

## 2019-09-12 NOTE — PROGRESS NOTES
Pt is a 25 y o   23w4d  Pregnancy is complicated by History of Preeclampsia, obesity, SS with increased risk of DS, but NIPT wnl  Pt reports +FM  Denies vb, lof, ctx  PTL precautions reviewed  Pt taking her ASA daily for pre-eclampsia prevention  Pt had level II u/s, incomplete, has follow up scheduled next month  3d trimester labs next visit  F/U 4 weeks

## 2019-10-10 ENCOUNTER — ROUTINE PRENATAL (OUTPATIENT)
Dept: OBGYN CLINIC | Facility: CLINIC | Age: 29
End: 2019-10-10
Payer: COMMERCIAL

## 2019-10-10 VITALS
OXYGEN SATURATION: 98 % | DIASTOLIC BLOOD PRESSURE: 74 MMHG | SYSTOLIC BLOOD PRESSURE: 112 MMHG | WEIGHT: 164.8 LBS | HEART RATE: 94 BPM | BODY MASS INDEX: 32.19 KG/M2

## 2019-10-10 DIAGNOSIS — Z34.82 SUPERVISION OF NORMAL INTRAUTERINE PREGNANCY IN MULTIGRAVIDA, SECOND TRIMESTER: Primary | ICD-10-CM

## 2019-10-10 DIAGNOSIS — Z23 NEED FOR INFLUENZA VACCINATION: ICD-10-CM

## 2019-10-10 PROCEDURE — 90471 IMMUNIZATION ADMIN: CPT | Performed by: NURSE PRACTITIONER

## 2019-10-10 PROCEDURE — PNV: Performed by: NURSE PRACTITIONER

## 2019-10-10 PROCEDURE — 90686 IIV4 VACC NO PRSV 0.5 ML IM: CPT | Performed by: NURSE PRACTITIONER

## 2019-10-10 NOTE — PROGRESS NOTES
26 yo  at 27w 4d gestation  Complicated by h/o pre-eclampsia, obesity, SS w/ increased risk DS but normal NIPT    Taking daily ASA  Reports some lower right back discomfort by end of the day  Resolves after off feet and resting  S=D, normal FHTs, normal BP    Discussed using pregnancy support belt, tylenol for muscular pains  Has appt with MFM on 10/17/19 for growth and to complete anatomy  Flu vax administered today  28w lab order provided    RV 2 wks

## 2019-10-15 LAB
EXTERNAL HEMATOCRIT: 37 %
EXTERNAL HEMOGLOBIN: 11.9 G/DL
EXTERNAL PLATELET COUNT: 209 K/ΜL
EXTERNAL SYPHILIS RPR SCREEN: NORMAL

## 2019-10-16 ENCOUNTER — OB ABSTRACT (OUTPATIENT)
Dept: OBGYN CLINIC | Facility: CLINIC | Age: 29
End: 2019-10-16

## 2019-10-16 ENCOUNTER — TELEPHONE (OUTPATIENT)
Dept: OBGYN CLINIC | Facility: CLINIC | Age: 29
End: 2019-10-16

## 2019-10-16 DIAGNOSIS — O99.810 ABNORMAL GLUCOSE TOLERANCE TEST (GTT) DURING PREGNANCY, ANTEPARTUM: Primary | ICD-10-CM

## 2019-10-16 LAB
BASOPHILS # BLD AUTO: 24 CELLS/UL (ref 0–200)
BASOPHILS NFR BLD AUTO: 0.2 %
EOSINOPHIL # BLD AUTO: 60 CELLS/UL (ref 15–500)
EOSINOPHIL NFR BLD AUTO: 0.5 %
ERYTHROCYTE [DISTWIDTH] IN BLOOD BY AUTOMATED COUNT: 12.5 % (ref 11–15)
GLUCOSE 1H P 50 G GLC PO SERPL-MCNC: 156 MG/DL
HCT VFR BLD AUTO: 37 % (ref 35–45)
HGB BLD-MCNC: 11.9 G/DL (ref 11.7–15.5)
LYMPHOCYTES # BLD AUTO: 1559 CELLS/UL (ref 850–3900)
LYMPHOCYTES NFR BLD AUTO: 13.1 %
MCH RBC QN AUTO: 28.3 PG (ref 27–33)
MCHC RBC AUTO-ENTMCNC: 32.2 G/DL (ref 32–36)
MCV RBC AUTO: 87.9 FL (ref 80–100)
MONOCYTES # BLD AUTO: 369 CELLS/UL (ref 200–950)
MONOCYTES NFR BLD AUTO: 3.1 %
NEUTROPHILS # BLD AUTO: 9889 CELLS/UL (ref 1500–7800)
NEUTROPHILS NFR BLD AUTO: 83.1 %
PLATELET # BLD AUTO: 209 THOUSAND/UL (ref 140–400)
PMV BLD REES-ECKER: 10.9 FL (ref 7.5–12.5)
RBC # BLD AUTO: 4.21 MILLION/UL (ref 3.8–5.1)
RPR SER QL: NORMAL
WBC # BLD AUTO: 11.9 THOUSAND/UL (ref 3.8–10.8)

## 2019-10-16 NOTE — TELEPHONE ENCOUNTER
Placed call to patient  Left message and made her aware that she can  RX at office or call us with a fax number to lab of her choosing

## 2019-10-16 NOTE — TELEPHONE ENCOUNTER
----- Message from Miley Sánchez MD sent at 10/16/2019  2:57 PM EDT -----  Please advise the patient that her 1 hour gtt was elevated and I recommend 3 hour gtt  This has been ordered   She can  rx or have it faxed

## 2019-10-17 ENCOUNTER — ULTRASOUND (OUTPATIENT)
Dept: PERINATAL CARE | Facility: OTHER | Age: 29
End: 2019-10-17
Payer: COMMERCIAL

## 2019-10-17 VITALS
SYSTOLIC BLOOD PRESSURE: 119 MMHG | HEART RATE: 102 BPM | HEIGHT: 60 IN | DIASTOLIC BLOOD PRESSURE: 84 MMHG | WEIGHT: 167.6 LBS | BODY MASS INDEX: 32.9 KG/M2

## 2019-10-17 DIAGNOSIS — O28.8 HIGH RISK PREGNANCY WITH HIGH INHIBIN: Primary | ICD-10-CM

## 2019-10-17 DIAGNOSIS — O09.899 HIGH RISK PREGNANCY WITH HIGH INHIBIN: Primary | ICD-10-CM

## 2019-10-17 DIAGNOSIS — E66.9 OBESITY (BMI 30.0-34.9): ICD-10-CM

## 2019-10-17 DIAGNOSIS — O99.213 MATERNAL OBESITY, ANTEPARTUM, THIRD TRIMESTER: ICD-10-CM

## 2019-10-17 DIAGNOSIS — Z3A.28 28 WEEKS GESTATION OF PREGNANCY: ICD-10-CM

## 2019-10-17 DIAGNOSIS — O09.292 HISTORY OF PRE-ECLAMPSIA IN PRIOR PREGNANCY, CURRENTLY PREGNANT IN SECOND TRIMESTER: ICD-10-CM

## 2019-10-17 DIAGNOSIS — O28.5 ABNORMAL GENETIC TEST DURING PREGNANCY: ICD-10-CM

## 2019-10-17 PROCEDURE — 76816 OB US FOLLOW-UP PER FETUS: CPT | Performed by: OBSTETRICS & GYNECOLOGY

## 2019-10-17 NOTE — PROGRESS NOTES
Please refer to the [facility] ultrasound report in Ob Procedures for additional information regarding the visit to the [facility]  today    Mark Bourne MD

## 2019-10-17 NOTE — LETTER
October 17, 2019     Sandra Conroy MD  [address]  [facility]  [address]  [address] [place] [address]    Patient: Wilner Last   YOB: 1990   Date of Visit: 10/17/2019       Dear Dr Lina Sumner: Thank you for referring Rommel Laurent to me for evaluation  Below are my notes for this consultation  If you have questions, please do not hesitate to call me  I look forward to following your patient along with you  Sincerely,        Mark Bourne MD        CC: No Recipients  Mark Bourne MD  10/17/2019  8:42 AM  Sign at close encounter    Please refer to the [facility] ultrasound report in Ob Procedures for additional information regarding the visit to the [facility]  today    Mark Bourne MD

## 2019-10-18 LAB
GLUCOSE 1H P GLC SERPL-MCNC: 178 MG/DL
GLUCOSE 2H P 75 G GLC PO SERPL-MCNC: 158 MG/DL
GLUCOSE 3H P 100 G GLC PO SERPL-MCNC: 147 MG/DL

## 2019-10-19 LAB
GLUCOSE 1H P CHAL SERPL-MCNC: 178 MG/DL
GLUCOSE 2H P CHAL SERPL-MCNC: 158 MG/DL
GLUCOSE 3H P 100 G GLC PO SERPL-MCNC: 147 MG/DL
GLUCOSE P FAST SERPL-MCNC: 81 MG/DL (ref 65–94)

## 2019-10-21 PROBLEM — O24.410 DIET CONTROLLED GESTATIONAL DIABETES MELLITUS (GDM) IN THIRD TRIMESTER: Status: ACTIVE | Noted: 2019-10-21

## 2019-10-24 ENCOUNTER — ROUTINE PRENATAL (OUTPATIENT)
Dept: OBGYN CLINIC | Facility: CLINIC | Age: 29
End: 2019-10-24

## 2019-10-24 VITALS
SYSTOLIC BLOOD PRESSURE: 120 MMHG | HEIGHT: 60 IN | DIASTOLIC BLOOD PRESSURE: 68 MMHG | BODY MASS INDEX: 32.39 KG/M2 | WEIGHT: 165 LBS

## 2019-10-24 DIAGNOSIS — O28.8 HIGH RISK PREGNANCY WITH HIGH INHIBIN: ICD-10-CM

## 2019-10-24 DIAGNOSIS — O24.410 DIET CONTROLLED GESTATIONAL DIABETES MELLITUS (GDM) IN THIRD TRIMESTER: ICD-10-CM

## 2019-10-24 DIAGNOSIS — O09.899 HIGH RISK PREGNANCY WITH HIGH INHIBIN: ICD-10-CM

## 2019-10-24 DIAGNOSIS — O24.419 GESTATIONAL DIABETES MELLITUS (GDM) IN THIRD TRIMESTER, GESTATIONAL DIABETES METHOD OF CONTROL UNSPECIFIED: Primary | ICD-10-CM

## 2019-10-24 PROCEDURE — PNV: Performed by: NURSE PRACTITIONER

## 2019-10-24 NOTE — PROGRESS NOTES
26 yo  at 29w 4d gestation  H/o pre-eclampsia, obesity, incrd risk DS on SS/ normal NIPT    Feeling well, no complaints  Baby is active  Denies LOF/VB or UCs  Taking  mg daily    Elevated 1 hour GTT of 156   3 hour GTT with 2 abnormal values: 2h 158 and 3h 147   6w US at [facility] for elevated inhibin level    S=D, normal FHTs, normal BP    Referral to [facility]/Diabetic counseling provided to patient  She will call today to book an appt  FKCs and PTLs precautions reviewed      RV 2w

## 2019-10-28 ENCOUNTER — OFFICE VISIT (OUTPATIENT)
Dept: PERINATAL CARE | Facility: OTHER | Age: 29
End: 2019-10-28
Payer: COMMERCIAL

## 2019-10-28 VITALS
BODY MASS INDEX: 32.24 KG/M2 | SYSTOLIC BLOOD PRESSURE: 116 MMHG | HEART RATE: 114 BPM | WEIGHT: 164.2 LBS | DIASTOLIC BLOOD PRESSURE: 76 MMHG | HEIGHT: 60 IN

## 2019-10-28 DIAGNOSIS — Z3A.30 30 WEEKS GESTATION OF PREGNANCY: ICD-10-CM

## 2019-10-28 DIAGNOSIS — O24.419 GESTATIONAL DIABETES MELLITUS (GDM) IN THIRD TRIMESTER, GESTATIONAL DIABETES METHOD OF CONTROL UNSPECIFIED: Primary | ICD-10-CM

## 2019-10-28 PROCEDURE — G0108 DIAB MANAGE TRN  PER INDIV: HCPCS | Performed by: DIETITIAN, REGISTERED

## 2019-10-28 NOTE — PROGRESS NOTES
Thank you for referring your patient to [facility] Maternal Fetal Medicine Diabetes Education Program  Star Berg is a  25 y o  female who presents today for GDM Initial visit  (Class 1)  Patient is at 30w1d gestation, Estimated Date of Delivery: 20  Reviewed and updated the following from patients medical record: PMH, Problem List, Allergies, and Current Medications  Diagnosis:  Medical Diagnosis/ICD 10: GDM third trimester method of control unspecified    Discussed with patient what GDM is, pathophysiology of GDM, untreated GDM and maternal fetal complications including fetal macrosomia,  hypoglycemia, polyhydramnios, increased incidence of  section,  labor, and in severe cases fetal demise and still birth   Discussed importance of blood glucose monitoring, nutrition, and medication if necessary in achieving BG goals  Discussed resumption of non-diabetic state post delivery, but reviewed increased risk of Type 2 DM later in life and ways to reduce risk by maintaining a healthy weight and eating habits      PMH/PSH:  Past Medical History:   Diagnosis Date    Diet controlled gestational diabetes mellitus (GDM) in third trimester 10/21/2019    Gestational hypertension     during 3rd trimester, induced    Need for HPV vaccination     completed series    Varicella     Visual impairment      Past Surgical History:   Procedure Laterality Date    WISDOM TOOTH EXTRACTION         Labs:  No components found for: HGA1C  No results found for: EAG  Lab Results   Component Value Date    [id number] 156 (H) 10/15/2019     10/18:   Fasting 81 mg/dL  1 hr  mg/dL  2 hr  mg/dL  3 hr  mg/dL    Medications:  Current Outpatient Medications on File Prior to Visit   Medication Sig Dispense Refill    aspirin (ECOTRIN LOW STRENGTH) 81 mg EC tablet Take 2 tablets (162 mg total) by mouth daily 60 tablet 10    Prenatal MV-Min-Fe Fum-FA-DHA (PRENATAL 1 PO) Take 1 tablet by mouth daily       No current facility-administered medications on file prior to visit  Recent Ultrasound for Growth Shows:   DATE:10/17  Fetal Growth: Normal  CALEB: Normal    Anthropometrics:  Ht Readings from Last 3 Encounters:   10/24/19 5' 0 25" (1 53 m)   10/17/19 5' (1 524 m)   08/22/19 5' (1 524 m)     Wt Readings from Last 3 Encounters:   10/24/19 74 8 kg (165 lb)   10/17/19 76 kg (167 lb 9 6 oz)   10/10/19 74 8 kg (164 lb 12 8 oz)       Pre-gravid weight: 72 6 kg (160 lb)  Pre-gravid BMI: 31 0  Weight Change: 5 lb weight gain  Weight gain recommendations: BMI (> 30) 11-20 lbs      Meal Plan (daily calorie and protein needs):  1900 calories   Protein:83 gm/day      Diet Instruction:  1  Patient was provided with a GDM meal plan including 3 meals and 3 snacks  2  Discussed appropriate amounts of CHO, PRO, and Fat at each meal and snack  3  Reviewed CHO exchange list, and portion sizes for both CHO and PRO via food models  4  Instruction on how to read a food label  5  Provided suggested meal/snack options to increase nutrition and maintain consistent /snack intakes  6  Instructed on how to keep a 3-day food diary to be brought to next appointment  7  Encouraged  patient to eat every 2 5-3 hours while awake  8  Encouraged patient to go no longer than 8-10 hours fasting overnight until first meal of the day  Blood Glucose Monitoring/Meter Teaching:   Type of blood sugar meter provided: OneTouch Verio  Blood sugar reading during demo:108 mg/dL (2 hr PP)  Pt instructed on testing blood sugars: 4 x per day (Fasting, 2 hour after start of each meal)    Gave instruction on site selection, skin preparation, loading strips and lancet device, meter activation, obtaining blood sample, test strip and lancet disposal and storage, and recording log book entries  Patient has good understanding of material covered and was able to test their own blood sugar in office today       Instruction for reporting blood sugar results weekly or sooner or more frequent as indicated Via:   Phone: [phone]   Fax: [phone]   E-mail: [email or web address]  [email or web address]   My Chart    Blood Sugar Goal  Ranges:    Fastin-90 mg/dL   1 hour after the start of each meal: 135 mg/dL or <   2 hours after start of each meal: 120 mg/dL or <       Physical Activity Assessment:  Exercise: no  Type: N/A  Frequency: N/A    Discussed benefits of physical activity to optimize blood glucose control, encouraged activity at patient is physically able  Always consult a physician prior to starting an exercise program  Recommend 20-30 minutes daily  Nutrition Diagnosis Statement:  Nutrition Diagnosis: Altered nutrition related lab values (glucose)  Related to: GDM  As evidenced by: 2 hr  mg/dL, 3 h  mg/dL    Goals:  1  Fasting BG 60-90 mg/dL  2  2 hr PP Blood Glucose <120 mg/dL (1 hr PP <135)  3  GDM Diet   4  Patient stated goal    Monitoring and Evaluation  1  Adherence to GDM diet  2  Blood Glucose Monitoring   3  Weight/ Body Composition    Nutrition Assessment Questionnaire:   Personal History of diabetes? no  Family History of diabetes? yes mother had GDM  Previous diabetes education?: no  Occupation (including hours worked): unknown  Do you follow any special diet presently?:no  Do you have any ethnic dietary preferences? no  Do you have any food allergies or intolerances?no  Do you have any food aversions with this pregnancy? no  Food/Beverage Likes/Dislikes:none specific  How many meals/snacks do you eat daily? 3 meals and 2 snacks  Times of Meals/Snacks: B:7:00 S:9:00 L:10:30 S:1:30 D:5:00 S:8:30  How often do you eat out, get take out, or eat fast food each week? 1-2 time per week    Note:Pt was doing cereal or ice cream for bedtime snack at 8:30 prior  She also stated she was having flavored creamer in her coffee but has stopped that      Patient Response to Instruction:  Learner/s Present:Learners Present: Patient Educational Materials Provided: Diabetes in Pregnancy Booklet, Meal Plan 1900 calorie and 3-day Food Log  Teaching methods used: Listened to Instruction, Visualized Demonstration, Patient taught- back information , Label Reading, Food Models and Meter Teaching/Demo  Patients Response to Education Information Provided: Voices Understanding, Was able to teach back information provided and Provided practical ways to integrate information learned into daily routine  Readiness to Change: Preparation (Preparing to make changes)      Begin Time: 8:30 am  End Time: 9:50 pm    Class 2 (date): 11/4    Referring Provider: MARYJANE Myers    It was a pleasure working with them today  Please feel free to call with any questions or concerns      Ricardo Dixon RD, LDN  [facility]  Diabetes in Pregnancy Program  [address] [place] [address],[address]  [address]

## 2019-10-28 NOTE — PATIENT INSTRUCTIONS
1  Continue Meal Plan consisting of 3 meals and 3 snacks daily, including protein at each  2  Try to eat every 2-3 5 hours while awake  3  Do not exceed 8-10 hours fasting overnight  4  Continue self-monitoring blood glucose 4 times per day: fasting and 2 hours after the start of each meal (record in log book)  5  Submit blood sugar values weekly via email: [email or web address]  [email or web address] or My Chart  6  If no restriction from physician, recommend up to 30 minutes walking daily  7  Report blood sugars every Monday, first date to report Monday 10/4  8   testing supplies at pharmacy at earliest convenience

## 2019-10-29 RX ORDER — BLOOD SUGAR DIAGNOSTIC
STRIP MISCELLANEOUS
Qty: 100 EACH | Refills: 3 | Status: SHIPPED | OUTPATIENT
Start: 2019-10-29 | End: 2019-12-28 | Stop reason: HOSPADM

## 2019-10-29 RX ORDER — LANCETS 33 GAUGE
EACH MISCELLANEOUS 4 TIMES DAILY
Qty: 100 EACH | Refills: 3 | Status: SHIPPED | OUTPATIENT
Start: 2019-10-29 | End: 2020-02-13

## 2019-11-04 ENCOUNTER — OFFICE VISIT (OUTPATIENT)
Dept: PERINATAL CARE | Facility: OTHER | Age: 29
End: 2019-11-04
Payer: COMMERCIAL

## 2019-11-04 VITALS
HEIGHT: 60 IN | HEART RATE: 108 BPM | DIASTOLIC BLOOD PRESSURE: 82 MMHG | BODY MASS INDEX: 32.55 KG/M2 | WEIGHT: 165.8 LBS | SYSTOLIC BLOOD PRESSURE: 125 MMHG

## 2019-11-04 DIAGNOSIS — O09.292 HISTORY OF PRE-ECLAMPSIA IN PRIOR PREGNANCY, CURRENTLY PREGNANT IN SECOND TRIMESTER: ICD-10-CM

## 2019-11-04 DIAGNOSIS — O24.410 DIET CONTROLLED GESTATIONAL DIABETES MELLITUS (GDM) IN THIRD TRIMESTER: Primary | ICD-10-CM

## 2019-11-04 DIAGNOSIS — Z3A.31 31 WEEKS GESTATION OF PREGNANCY: ICD-10-CM

## 2019-11-04 PROCEDURE — G0108 DIAB MANAGE TRN  PER INDIV: HCPCS | Performed by: DIETITIAN, REGISTERED

## 2019-11-04 NOTE — PATIENT INSTRUCTIONS
1  Continue Meal Plan consisting of 3 meals and 3 snacks daily, including protein at each  2  Try to eat every 2-3 5 hours while awake  3  Do not exceed 8-10 hours fasting overnight  4  Continue self-monitoring blood glucose 4 times per day: fasting and 2 hours after the start of each meal (record in log book)  5  Submit blood sugar values weekly via email: [email or web address] or My Chart  6  If no restriction from physician, recommend up to 30 minutes walking daily  7  Please report blood sugars on Monday 11/11  8  Change bedtime snack when having rice kirspy cereal try adding chopped nuts or higher protein milk such fairlife milk or silk milk, try the enlightened with almonds no berries, and continue apple and peanut butter for bedtime snack  Protein shakes glucerna hunger smart, carnation instant breakfast essentials high protein, premier protein shakes

## 2019-11-04 NOTE — PROGRESS NOTES
Follow Up (Class 2): GDM    Thank you for referring your patient to [facility] Maternal Fetal Medicine Diabetes Education Program  Nani Kimble is a  25 y o  female who presents today for GDM follow up visit  Patient is at 31w1d gestation, Estimated Date of Delivery: 1/5/20  Reviewed and updated the following from patients medical record: PMH, Problem List, Allergies, and Current Medications  Diagnosis:  Medical Diagnosis/ICD 10: GDM third trimester method of control unspecified    PMH/PSH:  Past Medical History:   Diagnosis Date    Diet controlled gestational diabetes mellitus (GDM) in third trimester 10/21/2019    Gestational hypertension     during 3rd trimester, induced    Need for HPV vaccination     completed series    Varicella     Visual impairment      Past Surgical History:   Procedure Laterality Date    WISDOM TOOTH EXTRACTION         Labs:  No results found for: HGBA1C  No results found for: EAG  Lab Results   Component Value Date    [id number] 156 (H) 10/15/2019     10/18:  Fasting- 81 mg/dL  1 hr  mg/dL  2 hr  mg/dL  3 hr  mg/dL    Medications:  Current Outpatient Medications on File Prior to Visit   Medication Sig Dispense Refill    aspirin (ECOTRIN LOW STRENGTH) 81 mg EC tablet Take 2 tablets (162 mg total) by mouth daily 60 tablet 10    ONETOUCH DELICA LANCETS 96F MISC by Does not apply route 4 (four) times a day Test blood sugar 4 times per day fasting and 2 hours after the start of each meal 100 each 3    ONETOUCH VERIO test strip Test blood sugars 4 times per day, fasting and 2 hours after the start of each meal  100 each 3    Prenatal MV-Min-Fe Fum-FA-DHA (PRENATAL 1 PO) Take 1 tablet by mouth daily       No current facility-administered medications on file prior to visit          Anthropometrics:  Ht Readings from Last 3 Encounters:   10/28/19 5' (1 524 m)   10/24/19 5' 0 25" (1 53 m)   10/17/19 5' (1 524 m)     Wt Readings from Last 3 Encounters: 10/28/19 74 5 kg (164 lb 3 2 oz)   10/24/19 74 8 kg (165 lb)   10/17/19 76 kg (167 lb 9 6 oz)     Pre-Pregnant Weight: 72 6 kg (160 lb)  Pre-Pregnant BMI: 31 2  Weight Change: 4 lb weight gain    Recent Ultrasound for Growth Shows:   DATE: 10/17  Fetal Growth: Normal  CALEB: Normal    BG Log:      Per blood sugar log pt is having fasting glucose consistently at or slightly above 90 mg/dL  She wishes to continue trying diet and exercise for management at this time, but is okay with adding medication if indicated  Blood Glucose Monitoring:  Pt instructed on testing blood sugars: 4 x per day (Fasting, 2 hour after start of each meal)    Instruction for reporting blood sugar results weekly or sooner or more frequent as indicated Via:   Phone: [phone]   Fax: [phone]   E-mail: [email or web address]   My Chart    Date To Report Next: Monday,     Blood Sugar Goal  Ranges:    Fastin-90 mg/dL   1 hour after the start of each meal: 135 mg/dL or <   2 hours after start of each meal: 120 mg/dL or <    *Reviewed patient's 3-day food diary and BS log sheets, and provided suggestions for maintaining an individualized meal plan  Diet Recall (24-hr Intake): Wakes: Wakes at 5:30, test fasting at 5:50 am  Breakfast (7:00-7:30):   AM Snack (9:00-9:30):  Lunch (11:00):  PM Snack (2:00):  Dinner(5:00-6:00): Bedtime Snack (9:00): Bedtime: 9:30     3-day food log:      Per 3 day food log, patient is doing a great job balancing carbohydrate and protein intake per meals and snacks  As far as her bedtime snack, instructed on aiming for 15 gm carbohydrate and 2 oz of protein  Suggestions for adding protein at bedtime snack was provided  Meal Plan (recommended daily calorie and protein needs):  1900 calories   Protein: 83 gm/day    Diet Review:  1  Reviewed GDM meal plan including 3 meals and 3 snacks  2  Discussed appropriate amounts of CHO, PRO, and Fat at each meal and snack     3  Reviewed CHO exchange list, and portion sizes for both CHO and PRO via food models  4  Instruction on how to read a food label  5  Provided suggested meal/snack options to increase nutrition and maintain consistent /snack intakes  6  Encouraged  patient to eat every 2 5-3 hours while awake  7  Encouraged patient to go no longer than 10 hours fasting overnight until first meal of the day  Physical Activity Assessment:  Exercise (type/frequency): Walking daily 20-15     Discussed benefits of physical activity to optimize blood glucose control, encouraged activity at patient is physically able  Always consult a physician prior to starting an exercise program  Walking up to 30 min  daily can help improve blood glucose levels    Sick day Guidelines:   Patient advised that sickness will raise blood sugar and need to continue medication regimen as directed  If blood sugar is > 160 mg/dL twice in one day call doctor  Instructed on what to do when unable to consume normal meal plan  Hypoglycemia & Treatment Guidelines:  Reviewed what hypoglycemia is, signs and symptoms, and how to treat  Post-Partum Guidelines:  Completion of 75 gm CHO 2 hr gtt at 6 weeks post-partum to check for Type 2 DM diagnosis    Breastfeeding Guidelines:  Continue GDM meal plan plus additional 350-500 calories daily  Stay hydrated by drinking 8-10 (8 oz ) fluids daily  Examples of protein and carbohydrate snacks provided  Dining Out & Travel Guidelines:  Patient advised to be prepared with extra diabetes supplies, medications, and snacks, as well as sticking to the same time schedule and portions eaten at home for meals and snacks  Maternal-Fetal Testing:    Ultrasounds- growth scans every 4 weeks     NST- twice weekly starting at 32nd week GA   CALEB- weekly starting at 32 weeks GA    Nutrition Diagnosis Statement:  Nutrition Diagnosis: Altered nutrition related lab values (glucose)  Related to: GDM  As evidenced by: 1 hr  mg/dL, 2 hr  mg/dL, 3 hr  mg/dL    Goals:  1  Fasting BG 60-90 mg/dL  2  2 hr PP Blood Glucose <120 mg/dL (1 hr PP <135)  3  GDM Diet   4  Patient meeting goal: Yes patient is following meal plan as instructed     Monitoring and Evaluation  1  Adherence to GDM diet  2  Blood Glucose Monitoring   3  Weight/ Body Composition    Patient Response to Instruction:  Learner/s Present:Learners Present: Patient   Diabetes education in the past: no  Special Learning Needs: no    Educational Materials Provided: Education Materials MFM: After Visit Summary  Teaching methods used: Teaching Methods MFM: Listened to Instruction, Visualized Demonstration, Patient taught- back information , Label Reading and Food Models  Patients Response to Education Information Provided: Voices Understanding, Was able to teach back information provided and Provided practical ways to integrate information learned into daily routine  Comprehension: very good  Motivation: very good  Readiness to Change: Action (Actively implementing change)  Barriers to Learning/Change: no barriers    Expected Compliance: very good    Begin Time: 12:00 pm  End Time: 12:48 pm  Referring Provider: MARYJANE Patton    It was a pleasure working with them today  Please feel free to call with any questions or concerns      Tata Ferguson RD, LDN  [facility]aternal [facility]  Diabetes in Pregnancy Program  [facility], [address] [place] [address],[address]  [address]

## 2019-11-05 ENCOUNTER — HOSPITAL ENCOUNTER (OUTPATIENT)
Facility: HOSPITAL | Age: 29
Discharge: HOME | End: 2019-11-05
Attending: OBSTETRICS & GYNECOLOGY | Admitting: OBSTETRICS & GYNECOLOGY
Payer: COMMERCIAL

## 2019-11-05 ENCOUNTER — TELEPHONE (OUTPATIENT)
Dept: OBGYN CLINIC | Facility: CLINIC | Age: 29
End: 2019-11-05

## 2019-11-05 ENCOUNTER — TELEPHONE (OUTPATIENT)
Dept: PERINATAL CARE | Facility: CLINIC | Age: 29
End: 2019-11-05

## 2019-11-05 VITALS
WEIGHT: 165 LBS | RESPIRATION RATE: 16 BRPM | HEIGHT: 60 IN | HEART RATE: 113 BPM | BODY MASS INDEX: 32.39 KG/M2 | SYSTOLIC BLOOD PRESSURE: 109 MMHG | TEMPERATURE: 98 F | DIASTOLIC BLOOD PRESSURE: 67 MMHG

## 2019-11-05 PROBLEM — O47.03 THREATENED PREMATURE LABOR IN THIRD TRIMESTER: Status: ACTIVE | Noted: 2019-11-05

## 2019-11-05 PROCEDURE — 76817 TRANSVAGINAL US OBSTETRIC: CPT | Performed by: STUDENT IN AN ORGANIZED HEALTH CARE EDUCATION/TRAINING PROGRAM

## 2019-11-05 PROCEDURE — 99203 OFFICE O/P NEW LOW 30 MIN: CPT

## 2019-11-05 PROCEDURE — NC001 PR NO CHARGE: Performed by: STUDENT IN AN ORGANIZED HEALTH CARE EDUCATION/TRAINING PROGRAM

## 2019-11-05 NOTE — TELEPHONE ENCOUNTER
----- Message from Alec Lawrence sent at 11/4/2019  4:07 PM EST -----  Leonel Lemon,   Can you schedule a F/U in 3-4 weeks for this patient, thanks

## 2019-11-05 NOTE — PROGRESS NOTES
L&D Triage Note - OB/GYN  Januaryph BushKait Ghotra 25 y o  female MRN: [id number]  Unit/Bed#: L&D 329-01 Encounter: [id number]    Patient is seen by Dr Cordova :    Luciana Galdamez is a 25 y o   at 31w2d not in  labor    PLAN:    1) SSE: KOH, wet, mount, dry, nitrazene  2) CL  3) SVE  4) Continue routine prenatal care  5) Discharge from [facility] triage with  labor precautions   - Case discussed with Dr Leslee Le:    Luciana Galdamez 25 y o  Tari Sauceda at 31w2d with an Estimated Date of Delivery: 20 comes in complaining of contractions since 8:00 p m  Last night  She states that contractions discontinued at 10:45 a m  This morning  She denies any leakage of fluid, vaginal bleeding, endorses good fetal movement    The    Her current obstetrical history is significant for obesity, A1 GDM    Her past obstetrical history is significant for previous induction for preeclampsia without severe features    Contractions:  Not any more  Leakage of fluid:  Denies  Vaginal Bleeding:  Denies  Fetal movement: present    OBJECTIVE:    Vitals:    19 1124   Temp: 98 °F (36 7 °C)       ROS:  Constitutional: Negative  Respiratory: Negative  Cardiovascular: Negative    Gastrointestinal: Negative    General Physical Exam:  General: in no apparent distress  Cardiovascular: Cor RRR  Lungs: non-labored breathing  Abdomen: abdomen is soft without significant tenderness, masses, organomegaly or guarding  Lower extremeties: nontender    Cervical Exam  Speculum: Cervical os is visually closed, small amount of white physiologic discharge in vaginal vault  SVE: 0 / 0% / -3    Fetal monitoring:  FHT:  150 bpm/ Moderate 6 - 25 bpm / 15x15 accelerations, no decelerations  Liberal: none     KOH/WTMT:     Infection:   - no clue cells    - no hyphae   - no trichomonads present    Membrane status   - no ferning   - neg nitrazene   - no pooling     Imaging:       TVUS   - Cervical length    - 3 4cm    - 3 6cm    - 3 9cm   - Presentation: will Guzmán MD  PGY-2 OB/GYN Resident   11/5/2019 12:18 PM

## 2019-11-05 NOTE — TELEPHONE ENCOUNTER
I spoke with patient, she states she has had 5 contractions since 9, 3 of them which lasted 15 seconds and then 2 more lasting 10 seconds  Per Dr Paola Atwood have patient go to [facility] L&D to r/o pre term labor  L&D aware

## 2019-11-05 NOTE — TELEPHONE ENCOUNTER
Pt had 5 more contractions in the last hour please advise her to go to L&D for r/o PTL  Notify L&D of the same

## 2019-11-05 NOTE — PROCEDURES
Saul Villatoro Carole Cheema, a  at 31w2d with an MORRIS of 2020, by Last Menstrual Period, was seen at [facility] for the following procedure(s): $Procedure Type: US - Transvaginal]            CL: 3 4cm, 3 6cm, 3 9cm  No dynamic changes or funneling noted       Ultrasound Other  Fetal Presentation: Vertex  Cervical Length: 3 4  Funnel: No  Debris: No  Placenta Previa: No  Vasa Previa: No         Ultrasound Probe Disinfection    A transvaginal ultrasound was performed     Prior to use, disinfection was performed with High Level Disinfection Process (Trophon)  Probe serial number [facility]: [id number] was used    Desmond Guzmán MD  19  4:54 PM

## 2019-11-05 NOTE — TELEPHONE ENCOUNTER
Patient called to state that she believes she is having contractions, but isn't entirely sure  She is currently 31 weeks  They come every 10-12 minutes and only last about 10 seconds  She noticed a little bit of increased pressure as well  She is not leaking any fluid or changes in discharge  She is going to stay off her feet, hydrate and call the office in an hour to report any change

## 2019-11-05 NOTE — DISCHARGE INSTRUCTIONS
Early Labor Signs   WHAT YOU NEED TO KNOW:   Early labor signs can happen weeks, days, or hours before your baby is ready to be delivered  You may have false labor signs, which are also called Red[place] Dubon contractions  False labor is common and may happen several weeks or days before your actual labor  The contractions are not regular, and do not get closer together  The pain is usually mild, does not worsen, and is felt only in front  Redkey Dubon contractions may happen later in the day, and stop after you change position, walk, or rest   DISCHARGE INSTRUCTIONS:   Call 911 for any of the following:   · You have heavy vaginal bleeding  · You cannot get to the hospital before the baby starts to come out  Return to the emergency department if:   · You have regular, painful contractions that are less than 5 minutes apart and last 30 to 70 seconds each  · You have a constant trickle or sudden gush of clear fluid from your vagina  · You notice a sudden decrease in your baby's movement  Contact your obstetrician or healthcare provider if:   · You have pain in your lower back or abdomen that does not get better when you change positions  · You have bloody mucus or show  · You have questions or concerns about your condition or care  Early Labor signs and symptoms:   · Lightening  occurs when your baby drops inside your pelvis  You may feel increased pressure in your pelvis  This may happen a few weeks to a few hours before your labor begins  · Contractions  are cramps and tightening that occur in your uterus to help move the baby through your birth canal  Contractions occur regularly and more often each time  Each one lasts about 30 to 70 seconds, and gets stronger until you deliver your baby  Contractions do not go away with movement  The pain usually starts in your lower back and moves to your abdomen  · Effacement  occurs when your cervix softens and thins, so it can easily open for the baby  You will not be able to feel effacement  Your healthcare provider will examine your cervix for effacement  · Dilation  is widening of your cervix  Your healthcare provider will examine your cervix for dilation  Your cervix may start to dilate weeks before your baby is delivered  Your cervix will be fully opened and ready for delivery when it is dilated to 10 centimeters  · Increased discharge  from your vagina may occur  It may be brown, pink, clear, or slightly bloody  This discharge may also be called bloody show  Bloody show is a mucus plug that forms and blocks your cervix during pregnancy  The discharge may mean that your cervix is opening up and getting ready for delivery  · Rupture of membranes  is a sudden release of clear fluid from your vagina  Ruptured membranes means your water broke  Your healthcare provider may need to break your water if it does not happen on its own  © 2017 [facility] Information is for End User's use only and may not be sold, redistributed or otherwise used for commercial purposes  All illustrations and images included in CareNotes® are the copyrighted property of A D A M , Inc  or Magan Quiñonez  The above information is an  only  It is not intended as medical advice for individual conditions or treatments  Talk to your doctor, nurse or pharmacist before following any medical regimen to see if it is safe and effective for you

## 2019-11-07 ENCOUNTER — ROUTINE PRENATAL (OUTPATIENT)
Dept: OBGYN CLINIC | Facility: CLINIC | Age: 29
End: 2019-11-07
Payer: COMMERCIAL

## 2019-11-07 VITALS
BODY MASS INDEX: 32.15 KG/M2 | SYSTOLIC BLOOD PRESSURE: 118 MMHG | DIASTOLIC BLOOD PRESSURE: 76 MMHG | OXYGEN SATURATION: 98 % | WEIGHT: 164.6 LBS | HEART RATE: 110 BPM

## 2019-11-07 DIAGNOSIS — Z23 NEED FOR TDAP VACCINATION: ICD-10-CM

## 2019-11-07 DIAGNOSIS — O24.410 DIET CONTROLLED GESTATIONAL DIABETES MELLITUS (GDM) IN THIRD TRIMESTER: Primary | ICD-10-CM

## 2019-11-07 DIAGNOSIS — O09.292 HISTORY OF PRE-ECLAMPSIA IN PRIOR PREGNANCY, CURRENTLY PREGNANT IN SECOND TRIMESTER: ICD-10-CM

## 2019-11-07 DIAGNOSIS — O99.213 MATERNAL OBESITY, ANTEPARTUM, THIRD TRIMESTER: ICD-10-CM

## 2019-11-07 PROCEDURE — 90471 IMMUNIZATION ADMIN: CPT

## 2019-11-07 PROCEDURE — PNV: Performed by: OBSTETRICS & GYNECOLOGY

## 2019-11-07 PROCEDURE — 90715 TDAP VACCINE 7 YRS/> IM: CPT

## 2019-11-07 NOTE — PROGRESS NOTES
Pt is a 25 y o   31w4d  Pregnancy is complicated by Obesity, Gestational Diabetes A1 and History of Preeclampsia  Pt reports +FM  Denies vb, lof, ctx  PTL precautions  and fkc reviewed  Pt had contractions 2-3 days ago and was seen on L&D  Contractions have stopped since that time  Cervix was closed and long at that visit  Pt reports her FBG ranges 91-95 and her pp BG is in the low 100's  Has growth scan scheduled  BP wnl and pt taking ASA therapy  TDAP today  32 week folder given  F/U 2 weeks

## 2019-11-11 ENCOUNTER — DOCUMENTATION (OUTPATIENT)
Dept: PERINATAL CARE | Facility: OTHER | Age: 29
End: 2019-11-11

## 2019-11-11 NOTE — PROGRESS NOTES
Date:  19  RE: Jet Jacob    : 1990  Estimated Date of Delivery: 20  EGA: 32w1d  OB/GYN: Ashleigh Cleveland MD        Current regimen:  1900 calorie meal plan 3 meals and 3 snacks with protein at each  SMBG fasting and 2 hours after the start of each meal via One Touch Verio glucose meter  Pt advised at class 2 appointment on  to reduce serving of carbohydrate at bedtime snack and increase serving of protein  Note: pt already incorporating up to 30 minute walk following dinner meal most days of the week  Pt previously preferred to continue managing via diet but open to medication as indicated  Plan:  Fasting glucose consistently above target  Continue aiming for 15 gm CHO and 2-3 oz protein at bedtime snack  Recommend basal insulin start  Brianne Madsen to call and schedule insulin education appointment with patient  SMBG 4 x per day (Fasting, 2 hour after start of each meal) using OneTouch Verio glucose meter  Continue meal plan consisting of 3 meals and 3 snacks, including protein at each  If okay by OB, continue walking up to 20-30 minutes daily  10/17 US: normal fetal growth and CALEB  Date due to report next: At insulin education appointment      Oksana Quintero RD, LDN  Diabetes Educator   Diabetes and Pregnancy Program

## 2019-11-13 ENCOUNTER — TELEPHONE (OUTPATIENT)
Dept: PERINATAL CARE | Facility: CLINIC | Age: 29
End: 2019-11-13

## 2019-11-13 NOTE — TELEPHONE ENCOUNTER
----- Message from Farzad Masters sent at 11/11/2019 12:14 PM EST -----  Sanna Reaves,   This patient needs a half hour insulin education appointment preferably sometime this week  Can you give her a call to set up with one of us thanks

## 2019-11-15 ENCOUNTER — OFFICE VISIT (OUTPATIENT)
Dept: PERINATAL CARE | Facility: CLINIC | Age: 29
End: 2019-11-15
Payer: COMMERCIAL

## 2019-11-15 DIAGNOSIS — O09.292 HISTORY OF PRE-ECLAMPSIA IN PRIOR PREGNANCY, CURRENTLY PREGNANT IN SECOND TRIMESTER: ICD-10-CM

## 2019-11-15 DIAGNOSIS — Z3A.32 32 WEEKS GESTATION OF PREGNANCY: ICD-10-CM

## 2019-11-15 DIAGNOSIS — O24.414 INSULIN CONTROLLED GESTATIONAL DIABETES MELLITUS (GDM) IN THIRD TRIMESTER: Primary | ICD-10-CM

## 2019-11-15 PROCEDURE — 99213 OFFICE O/P EST LOW 20 MIN: CPT | Performed by: NURSE PRACTITIONER

## 2019-11-15 NOTE — PATIENT INSTRUCTIONS
1  Due to FBS>90, start Levemir 24 units at 9 PM daily  Add 3 AM glucose check  2  Continue GDM diet with 3 meals and 3 snacks including recommended combination of carb, protein and fat per meal/snack  3  Self monitoring blood glucose fasting, 2 hours post start meal, with hypoglycemia and before driving  4  Always have glucose available for hypoglycemia, use 15 by 15 rule, refer to patient education sheet  5  Report glucose readings on Monday, 11/18/19 or sooner if needed  6  Stay active if no restrictions from your OBGYN, walk up to 30 minutes  7  NST twice a week and CALEB weekly  8  Compete fetal kick counts  9  Continue follow up with your OBGYN and MFM as recommended  Monday, 11/18/19

## 2019-11-15 NOTE — PROGRESS NOTES
Michael Aguilera is a  25 y o  female who presents today for insulin administration instruction  Patient is at 32w5d gestation, Estimated Date of Delivery: 1/5/20  SMBG 4  Times a day and following GDM diet  Reviewed and updated the following from patients medical record: PMH, Problem List, Allergies, and Current Medications  Diagnosis:  Medical Diagnosis/ICD 10:   Encounter Diagnosis     ICD-10-CM    1  Insulin controlled gestational diabetes mellitus (GDM) in third trimester O24 414 insulin detemir (LEVEMIR FLEXTOUCH) 100 Units/mL injection pen     Insulin Pen Needle 31G X 5 MM MISC     Mychart glucose flowsheet   2  History of pre-eclampsia in prior pregnancy, currently pregnant in second trimester O09 292 insulin detemir (LEVEMIR FLEXTOUCH) 100 Units/mL injection pen     Insulin Pen Needle 31G X 5 MM MISC     Mychart glucose flowsheet   3  32 weeks gestation of pregnancy Z3A 32 insulin detemir (LEVEMIR FLEXTOUCH) 100 Units/mL injection pen     Insulin Pen Needle 31G X 5 MM MISC     Mychart glucose flowsheet       PMH/PSH:  Past Medical History:   Diagnosis Date    Diet controlled gestational diabetes mellitus (GDM) in third trimester 10/21/2019    Gestational hypertension     during 3rd trimester, induced    Need for HPV vaccination     completed series    Varicella     Visual impairment      Past Surgical History:   Procedure Laterality Date    WISDOM TOOTH EXTRACTION         Labs:  No results found for: HGBA1C  No results found for: EAG  Lab Results   Component Value Date    [id number] 156 (H) 10/15/2019   10/18/19 3 hour GTT 81, 178, 158, 147    Medications:  Prenatal vitamin  Baby aspirin  Onetouch Delica Lancets  Onetouch Verio test strips    Insulin Type/amount: Levemir 24 units  Administration Time(s): 9 PM daily  The patient was instructed on the following:   Insulin administration times, insulin action   Hypoglycemia signs, symptoms and treatment     Increase in maternal-fetal surveillance with insulin initiation   Side effects of hyperglycemia in pregnancy including macrosomia,  hypoglycemia, polyhydramnios, increase in blood pressure and stillbirth   Continue to monitor blood glucoses via fingerstick fasting (goal 60 mg/dl to 90 mg/dl) and two hours post prandial (goal less than 120 mg/dl)   Non-stress testing two times weekly and CALEB testing beginning at 32 weeks gestation   Ultrasounds every 4 weeks at the [address] [place] [address] Maternal Fetal Medicine   HbA1c every 6 to 8 weeks    Labs Ordered: none  Follow Up Date: Monday, 19  Refer to patient instructions      Begin Time: 08:30 AM  End Time: 09:15 AM    Geraldine Medina, [address] [place] [address] Maternal Fetal Medicine  Diabetes in Pregnancy Program  [address] [place] [address], [address] [place] [address],[address]  [address]

## 2019-11-18 ENCOUNTER — DOCUMENTATION (OUTPATIENT)
Dept: PERINATAL CARE | Facility: OTHER | Age: 29
End: 2019-11-18

## 2019-11-18 ENCOUNTER — ULTRASOUND (OUTPATIENT)
Dept: PERINATAL CARE | Facility: OTHER | Age: 29
End: 2019-11-18
Payer: COMMERCIAL

## 2019-11-18 VITALS
WEIGHT: 163.4 LBS | DIASTOLIC BLOOD PRESSURE: 70 MMHG | HEIGHT: 60 IN | SYSTOLIC BLOOD PRESSURE: 112 MMHG | BODY MASS INDEX: 32.08 KG/M2 | HEART RATE: 120 BPM

## 2019-11-18 DIAGNOSIS — O24.414 INSULIN CONTROLLED GESTATIONAL DIABETES MELLITUS (GDM) IN THIRD TRIMESTER: ICD-10-CM

## 2019-11-18 DIAGNOSIS — Z3A.33 33 WEEKS GESTATION OF PREGNANCY: ICD-10-CM

## 2019-11-18 DIAGNOSIS — O09.292 HISTORY OF PRE-ECLAMPSIA IN PRIOR PREGNANCY, CURRENTLY PREGNANT IN SECOND TRIMESTER: ICD-10-CM

## 2019-11-18 PROCEDURE — 59025 FETAL NON-STRESS TEST: CPT | Performed by: OBSTETRICS & GYNECOLOGY

## 2019-11-18 PROCEDURE — 76815 OB US LIMITED FETUS(S): CPT | Performed by: OBSTETRICS & GYNECOLOGY

## 2019-11-18 NOTE — PROGRESS NOTES
Date:  19  RE: Amanda Franz    : 1990  Estimated Date of Delivery: 20  EGA: 33w1d  OB/GYN: Pat Tsang MD        Current regimen:  Levemir- 24 units at 9:00 or 10:00 pm daily (started night of Friday, 11/15)  1900 calorie meal plan 3 meals and 3 snacks with protein at each  SMBG fasting and 2 hours after the start of each meal via One Touch Verio glucose meter  Pt advised previously to reduce serving of carbohydrate at bedtime snack and increase serving of protein  Note: pt already incorporating up to 30 minute walk following dinner meal most days of the week  Plan:  Fasting glucose trending down  Pt had insulin education appointment on Friday, 11/15  Levemir- continue 24 units at 9:00 or 10:00 pm daily  Continue aiming for 15 gm CHO and 2-3 oz protein at bedtime snack  SMBG 4 x per day (Fasting, 2 hour after start of each meal) using OneTouch Verio glucose meter  Continue meal plan consisting of 3 meals and 3 snacks, including protein at each  If okay by OB, continue walking up to 20-30 minutes daily   US: CALEB normal  10/17 US: normal fetal growth and CALEB  Date due to report next:  Monday,  or sooner if have more frequent elevations      Rafael Hoover RD, LDN  Diabetes Educator   Diabetes and Pregnancy Program

## 2019-11-18 NOTE — PATIENT INSTRUCTIONS
Nonstress Test for Pregnancy   WHAT YOU NEED TO KNOW:   What do I need to know about a nonstress test?  A nonstress test measures your baby's heart rate and movements  Nonstress means that no stress will be placed on your baby during the test    How do I prepare for a nonstress test?  Your healthcare provider will talk to you about how to prepare for this test  He may tell you to eat and drink plenty of fluids before your test  If you smoke, you may be asked not to smoke within 2 hours before the test  He will also tell you what medicines to take or not take on the day of your test    What will happen during a nonstress test?  You may be asked to lie down or recline back for the test on a bed  One or two belts with sensors will be placed around your abdomen  Your baby's heart rate will be recorded with a machine  If your baby does not move, your baby may be asleep  Your healthcare provider may make a noise near your abdomen to try to wake your baby  The test usually takes about 20 minutes, but can take longer if your baby needs to be awakened  What do I need to know about the test results? Your baby will be expected to move at least twice for a certain amount of time  Your baby's heart rate will be expected to go up by a certain number of beats per minute during movement  If your baby does not move as expected, the test may need to be repeated or you may need other tests  CARE AGREEMENT:   You have the right to help plan your care  Learn about your health condition and how it may be treated  Discuss treatment options with your caregivers to decide what care you want to receive  You always have the right to refuse treatment  The above information is an  only  It is not intended as medical advice for individual conditions or treatments  Talk to your doctor, nurse or pharmacist before following any medical regimen to see if it is safe and effective for you    © 2017 [employer]  Information is for End User's use only and may not be sold, redistributed or otherwise used for commercial purposes  All illustrations and images included in CareNotes® are the copyrighted property of A D A M , Inc  or Magan Quiñonez

## 2019-11-21 ENCOUNTER — ROUTINE PRENATAL (OUTPATIENT)
Dept: PERINATAL CARE | Facility: OTHER | Age: 29
End: 2019-11-21
Payer: COMMERCIAL

## 2019-11-21 ENCOUNTER — ROUTINE PRENATAL (OUTPATIENT)
Dept: OBGYN CLINIC | Facility: CLINIC | Age: 29
End: 2019-11-21

## 2019-11-21 VITALS
WEIGHT: 163.4 LBS | DIASTOLIC BLOOD PRESSURE: 82 MMHG | SYSTOLIC BLOOD PRESSURE: 130 MMHG | HEART RATE: 107 BPM | OXYGEN SATURATION: 98 % | BODY MASS INDEX: 31.91 KG/M2

## 2019-11-21 VITALS
DIASTOLIC BLOOD PRESSURE: 89 MMHG | BODY MASS INDEX: 32 KG/M2 | HEIGHT: 60 IN | HEART RATE: 100 BPM | SYSTOLIC BLOOD PRESSURE: 124 MMHG | WEIGHT: 163 LBS

## 2019-11-21 DIAGNOSIS — Z3A.33 33 WEEKS GESTATION OF PREGNANCY: ICD-10-CM

## 2019-11-21 DIAGNOSIS — O24.414 INSULIN CONTROLLED GESTATIONAL DIABETES MELLITUS (GDM) IN THIRD TRIMESTER: Primary | ICD-10-CM

## 2019-11-21 DIAGNOSIS — O24.414 INSULIN CONTROLLED GESTATIONAL DIABETES MELLITUS (GDM) IN THIRD TRIMESTER: ICD-10-CM

## 2019-11-21 PROCEDURE — PNV: Performed by: NURSE PRACTITIONER

## 2019-11-21 PROCEDURE — 59025 FETAL NON-STRESS TEST: CPT | Performed by: OBSTETRICS & GYNECOLOGY

## 2019-11-21 NOTE — PATIENT INSTRUCTIONS

## 2019-11-21 NOTE — PROGRESS NOTES
26 yo X2O6 at 59U2A, complicated by UMBM5/VQELOYV controlled; hx pre-ecl in last pregnancy; elevated inhibin; normal NIPT    Doing twice weekly AN surveillance w/ MFM 11/18/19 MFM NST reactive/CALEB normal     Taking  mg daily  FBG , PP     Baby, BOY, is active, denies LOF,VB or UCs     S=D, normal FHTs, normal BP    GBS next visit

## 2019-11-25 ENCOUNTER — OFFICE VISIT (OUTPATIENT)
Dept: PERINATAL CARE | Facility: OTHER | Age: 29
End: 2019-11-25
Payer: COMMERCIAL

## 2019-11-25 VITALS
BODY MASS INDEX: 32.11 KG/M2 | HEART RATE: 118 BPM | SYSTOLIC BLOOD PRESSURE: 138 MMHG | WEIGHT: 164.4 LBS | DIASTOLIC BLOOD PRESSURE: 87 MMHG

## 2019-11-25 DIAGNOSIS — O09.292 HISTORY OF PRE-ECLAMPSIA IN PRIOR PREGNANCY, CURRENTLY PREGNANT IN SECOND TRIMESTER: ICD-10-CM

## 2019-11-25 DIAGNOSIS — O24.414 INSULIN CONTROLLED GESTATIONAL DIABETES MELLITUS (GDM) IN THIRD TRIMESTER: Primary | ICD-10-CM

## 2019-11-25 DIAGNOSIS — Z3A.34 34 WEEKS GESTATION OF PREGNANCY: ICD-10-CM

## 2019-11-25 PROCEDURE — G0108 DIAB MANAGE TRN  PER INDIV: HCPCS | Performed by: DIETITIAN, REGISTERED

## 2019-11-25 NOTE — PATIENT INSTRUCTIONS
1  Continue Meal Plan consisting of 3 meals and 3 snacks daily, including protein at each  2  Try to eat every 2-3 5 hours while awake  3  Do not exceed 8-10 hours fasting overnight  4  Continue self-monitoring blood glucose 4 times per day: fasting and 2 hours after the start of each meal (record in log book)  5  Submit blood sugar values weekly via email: [email or web address]  [email or web address]  org or My Chart  6  If no restriction from physician, recommend up to 30 minutes walking daily  7  Increase Levemir to 26 units at 9:15-9:30 pm daily  8  If hungry between lunch and dinner can fit in 2 snacks verses one snack as long as   9  Continue 20-30 minute walk after dinner as able  10  Report blood sugars on Monday, 11/ 2

## 2019-11-25 NOTE — PROGRESS NOTES
Thank you for referring your patient to [facility] Maternal Fetal Medicine Diabetes Education Program  Leti Nazario is a  25 y o  female who presents today for follow up counseling to review blood sugar logs and meal intakes  Patient is at 34w1d gestation, Estimated Date of Delivery: 1/5/20  Reviewed and updated the following from patients medical record: PMH, Problem List, Allergies, and Current Medications  Diagnosis:  Insulin controlled GDM third trimester    PMH/PSH:  Past Medical History:   Diagnosis Date    Diet controlled gestational diabetes mellitus (GDM) in third trimester 10/21/2019    Gestational hypertension     during 3rd trimester, induced    Need for HPV vaccination     completed series    Varicella     Visual impairment      Past Surgical History:   Procedure Laterality Date    WISDOM TOOTH EXTRACTION         Labs:    Lab Results   Component Value Date    [id number] 156 (H) 10/15/2019       Medications:  Current Outpatient Medications on File Prior to Visit   Medication Sig Dispense Refill    aspirin (ECOTRIN LOW STRENGTH) 81 mg EC tablet Take 2 tablets (162 mg total) by mouth daily 60 tablet 10    insulin detemir (LEVEMIR FLEXTOUCH) 100 Units/mL injection pen Inject SC 24 units at 9 PM daily  To be titrated  15 mL 0    Insulin Pen Needle 31G X 5 MM MISC Use one a day or as directed  100 each 1    ONETOUCH DELICA LANCETS 95U MISC by Does not apply route 4 (four) times a day Test blood sugar 4 times per day fasting and 2 hours after the start of each meal 100 each 3    ONETOUCH VERIO test strip Test blood sugars 4 times per day, fasting and 2 hours after the start of each meal  100 each 3    Prenatal MV-Min-Fe Fum-FA-DHA (PRENATAL 1 PO) Take 1 tablet by mouth daily       No current facility-administered medications on file prior to visit          Anthropometrics:  Ht Readings from Last 3 Encounters:   11/21/19 5' (1 524 m)   11/18/19 5' (1 524 m)   11/05/19 5' (1 524 m) Wt Readings from Last 3 Encounters:   11/25/19 74 6 kg (164 lb 6 4 oz)   11/21/19 73 9 kg (163 lb)   11/21/19 74 1 kg (163 lb 6 4 oz)     Pre-gravid Weight: 72 6 kg (160 lb)  Pre-gravid BMI: 31 2  Weight Change: 4 lb weight gain    Recent Ultrasound for Growth Shows:   Date: 10/17  Fetal Growth: Normal  CALEB: Normal    Meal Plan (based on recommended daily calorie and protein needs):  1900 calories   Protein: 83 gm/day    BG Log:         Per her blood sugar log, will increase Levemir from 24 units to 26 units at 9 or 10 pm daily  24-Hr Diet Recall:  Wakes:   Breakfast (7:00): 1 egg, cheese slice, with [employer] sandwich thins with cottage cheese, OR overnight oats with fairlife milk, (1/2 cup oats, 1/2 cup  milk, and nuts  Snack (9:00): cottage cheese doubles, with whole wheat crackers or triscuits with laghing cow cheese, or small orange or apple   Lunch (11:00): Snack(2:00): greek yogurt and protein granola  Nuts or sting cheese if hungry between  [address] (5:00-6:00): sweet potato (1/2) with cinnamon, cauliflower rice or cauliflower mashed potatoe, spaghetti and (3 oz) meat balls, zuchinni noodles, 1/2 cup of regular noodles   Snack(9:00): Bedtime: takes insulin at 9:15-9:30        Diet Instruction:  1  Reviewed meal plan with patient including 3 meals and 3 snacks daily  2  Reinforced appropriate amounts of CHO, PRO, and Fat to have at each meal and snack  3  Reviewed appropriate portion sizes of CHO, PRO and Fat via food models  4  Review of how to read a food label provided  5  Provided suggested meal/snack options to increase nutrition and maintain consistent /snack intakes  Physical Activity Assessment:  Exercise (type/frequency):  Walks 20 minutes after dinner most days    Discussed benefits of physical activity in maintaining healthy weight in pregnancy, and encouraged activity as physically able   Always consult a physician prior to starting an exercise program  Recommend 20-30 minutes daily       Monitoring and Evaluation  1  Adherence to MNT diet  2  Weight/ Body Composition  3  Adherence to exercise plan (if applicable)      Begin Time: 11:00 am   End Time: 11:30 am  Referring Provider: MARYJANE Gtz    It was a pleasure working with them today  Please feel free to call with any questions or concerns      Jimmy Boateng RD, JALYNN  [facility]aternal Fetal [facility]  Diabetes in Pregnancy Program  [address]  [address]

## 2019-11-26 ENCOUNTER — ROUTINE PRENATAL (OUTPATIENT)
Dept: PERINATAL CARE | Facility: OTHER | Age: 29
End: 2019-11-26
Payer: COMMERCIAL

## 2019-11-26 VITALS
BODY MASS INDEX: 32.04 KG/M2 | HEART RATE: 119 BPM | WEIGHT: 163.2 LBS | HEIGHT: 60 IN | DIASTOLIC BLOOD PRESSURE: 68 MMHG | SYSTOLIC BLOOD PRESSURE: 110 MMHG

## 2019-11-26 DIAGNOSIS — O24.414 INSULIN CONTROLLED GESTATIONAL DIABETES MELLITUS (GDM) IN THIRD TRIMESTER: ICD-10-CM

## 2019-11-26 DIAGNOSIS — Z3A.34 34 WEEKS GESTATION OF PREGNANCY: Primary | ICD-10-CM

## 2019-11-26 PROCEDURE — 59025 FETAL NON-STRESS TEST: CPT | Performed by: OBSTETRICS & GYNECOLOGY

## 2019-11-29 ENCOUNTER — APPOINTMENT (OUTPATIENT)
Dept: PERINATAL CARE | Facility: OTHER | Age: 29
End: 2019-11-29
Payer: COMMERCIAL

## 2019-11-29 ENCOUNTER — ULTRASOUND (OUTPATIENT)
Dept: PERINATAL CARE | Facility: OTHER | Age: 29
End: 2019-11-29
Payer: COMMERCIAL

## 2019-11-29 VITALS
HEIGHT: 60 IN | WEIGHT: 163 LBS | SYSTOLIC BLOOD PRESSURE: 122 MMHG | DIASTOLIC BLOOD PRESSURE: 80 MMHG | HEART RATE: 106 BPM | BODY MASS INDEX: 32 KG/M2

## 2019-11-29 DIAGNOSIS — O09.292 HISTORY OF PRE-ECLAMPSIA IN PRIOR PREGNANCY, CURRENTLY PREGNANT IN SECOND TRIMESTER: ICD-10-CM

## 2019-11-29 DIAGNOSIS — O28.8 HIGH RISK PREGNANCY WITH HIGH INHIBIN: ICD-10-CM

## 2019-11-29 DIAGNOSIS — O09.899 HIGH RISK PREGNANCY WITH HIGH INHIBIN: ICD-10-CM

## 2019-11-29 DIAGNOSIS — O24.414 INSULIN CONTROLLED GESTATIONAL DIABETES MELLITUS (GDM) IN THIRD TRIMESTER: Primary | ICD-10-CM

## 2019-11-29 DIAGNOSIS — Z3A.34 34 WEEKS GESTATION OF PREGNANCY: ICD-10-CM

## 2019-11-29 PROCEDURE — 76816 OB US FOLLOW-UP PER FETUS: CPT | Performed by: OBSTETRICS & GYNECOLOGY

## 2019-11-29 PROCEDURE — 59025 FETAL NON-STRESS TEST: CPT | Performed by: OBSTETRICS & GYNECOLOGY

## 2019-11-29 PROCEDURE — 99212 OFFICE O/P EST SF 10 MIN: CPT | Performed by: OBSTETRICS & GYNECOLOGY

## 2019-11-29 NOTE — LETTER
November 29, 2019     Marjan Teresa MD  [address] [facility]  [address]  [address]    Patient: Ericka Martinez   YOB: 1990   Date of Visit: 11/29/2019       Dear Dr Paola Atwood: Thank you for referring Paulett Cranker to me for evaluation  Below are my notes for this consultation  If you have questions, please do not hesitate to call me  I look forward to following your patient along with you           Sincerely,        Hector Ferrara MD        CC: No Recipients

## 2019-11-29 NOTE — PROGRESS NOTES
Please refer to the [facility] ultrasound report in Ob Procedures for additional information regarding the visit to the [facility]  today

## 2019-12-02 ENCOUNTER — DOCUMENTATION (OUTPATIENT)
Dept: PERINATAL CARE | Facility: OTHER | Age: 29
End: 2019-12-02

## 2019-12-02 NOTE — PROGRESS NOTES
Date:  19  RE: Taylor Uribe    : 1990  Estimated Date of Delivery: 20  EGA: 35w1d  OB/GYN: Vane Torres MD        Current regimen:  Levemir- 26 units at 9:00 or 10:00 pm daily (started night of Friday, 11/15)  1900 calorie meal plan 3 meals and 3 snacks with protein at each  SMBG fasting and 2 hours after the start of each meal via One Touch Verio glucose meter  Pt advised previously to reduce serving of carbohydrate at bedtime snack and increase serving of protein  Note: pt already incorporating up to 30 minute walk following dinner meal most days of the week  Plan:  Fasting glucose continues at or slightly above target  Pt had 2 PP elevations which she stated in her email that were due to thanksgiving meal as well as going out to eat for her birthday  Levemir- increase from 26 units to 30 units at 9:00 or 10:00 pm daily  Continue aiming for 15 gm CHO and 2-3 oz protein at bedtime snack  SMBG 4 x per day (Fasting, 2 hour after start of each meal) using OneTouch Verio glucose meter  Continue meal plan consisting of 3 meals and 3 snacks, including protein at each  If okay by OB, continue walking up to 20-30 minutes daily   US: Fetal Growth and CALEB normal    Date due to report next:  Monday, 19 or sooner if have more frequent elevations      Pierce Contreras RD, LDN  Diabetes Educator   Diabetes and Pregnancy Program

## 2019-12-03 ENCOUNTER — ULTRASOUND (OUTPATIENT)
Dept: PERINATAL CARE | Facility: OTHER | Age: 29
End: 2019-12-03
Payer: COMMERCIAL

## 2019-12-03 VITALS
SYSTOLIC BLOOD PRESSURE: 127 MMHG | WEIGHT: 164.4 LBS | BODY MASS INDEX: 32.28 KG/M2 | HEIGHT: 60 IN | DIASTOLIC BLOOD PRESSURE: 82 MMHG | HEART RATE: 106 BPM

## 2019-12-03 DIAGNOSIS — O28.8 HIGH RISK PREGNANCY WITH HIGH INHIBIN: ICD-10-CM

## 2019-12-03 DIAGNOSIS — O24.414 INSULIN CONTROLLED GESTATIONAL DIABETES MELLITUS (GDM) IN THIRD TRIMESTER: ICD-10-CM

## 2019-12-03 DIAGNOSIS — O99.213 MATERNAL OBESITY, ANTEPARTUM, THIRD TRIMESTER: ICD-10-CM

## 2019-12-03 DIAGNOSIS — O09.899 HIGH RISK PREGNANCY WITH HIGH INHIBIN: ICD-10-CM

## 2019-12-03 DIAGNOSIS — E66.9 OBESITY (BMI 30.0-34.9): ICD-10-CM

## 2019-12-03 DIAGNOSIS — Z3A.35 35 WEEKS GESTATION OF PREGNANCY: Primary | ICD-10-CM

## 2019-12-03 PROCEDURE — 59025 FETAL NON-STRESS TEST: CPT | Performed by: OBSTETRICS & GYNECOLOGY

## 2019-12-03 PROCEDURE — 76815 OB US LIMITED FETUS(S): CPT | Performed by: OBSTETRICS & GYNECOLOGY

## 2019-12-03 NOTE — PROGRESS NOTES
Fetal ultrasound examination for evaluation of CALEB and NST at   35 2/7 weeks gestation  Hx of    A2GDM  She is asymptomatic  See Ob procedures in EPIC  1  CALEB  12 3 wnl  2  NST  reactive  Recommendations; 1  Twice a week NSTs, once a week CALEB  2  Fetal movement counts  Thank you for referring your patient to our offices  If you have any further questions do not hesitate to contact us as [phone]      Dana Burleson MD

## 2019-12-05 ENCOUNTER — ROUTINE PRENATAL (OUTPATIENT)
Dept: OBGYN CLINIC | Facility: CLINIC | Age: 29
End: 2019-12-05

## 2019-12-05 VITALS
SYSTOLIC BLOOD PRESSURE: 132 MMHG | DIASTOLIC BLOOD PRESSURE: 78 MMHG | BODY MASS INDEX: 32.22 KG/M2 | HEART RATE: 114 BPM | OXYGEN SATURATION: 97 % | WEIGHT: 165 LBS

## 2019-12-05 DIAGNOSIS — O28.8 HIGH RISK PREGNANCY WITH HIGH INHIBIN: ICD-10-CM

## 2019-12-05 DIAGNOSIS — O09.899 HIGH RISK PREGNANCY WITH HIGH INHIBIN: ICD-10-CM

## 2019-12-05 DIAGNOSIS — O24.414 INSULIN CONTROLLED GESTATIONAL DIABETES MELLITUS (GDM) IN THIRD TRIMESTER: Primary | ICD-10-CM

## 2019-12-05 DIAGNOSIS — O99.213 MATERNAL OBESITY, ANTEPARTUM, THIRD TRIMESTER: ICD-10-CM

## 2019-12-05 DIAGNOSIS — O09.293 HISTORY OF PRE-ECLAMPSIA IN PRIOR PREGNANCY, CURRENTLY PREGNANT IN THIRD TRIMESTER: ICD-10-CM

## 2019-12-05 PROBLEM — O09.292 HISTORY OF PRE-ECLAMPSIA IN PRIOR PREGNANCY, CURRENTLY PREGNANT IN SECOND TRIMESTER: Status: RESOLVED | Noted: 2019-06-19 | Resolved: 2019-12-05

## 2019-12-05 PROCEDURE — PNV: Performed by: OBSTETRICS & GYNECOLOGY

## 2019-12-05 RX ORDER — BREAST PUMP
EACH MISCELLANEOUS ONCE
Qty: 1 EACH | Refills: 0 | Status: SHIPPED | OUTPATIENT
Start: 2019-12-05 | End: 2019-12-05

## 2019-12-05 NOTE — PROGRESS NOTES
Pt is a 30 y o   35w4d  Pregnancy is complicated by Gestational Diabetes A2,  h o pre-eclampsia, elevated inhibin with normal NIPT this pregnancy  Pt reports +FM  Denies vb, lof, ctx  PTL precautions  and fkc reviewed  Notes increasing pelvic pressure  Reports FBG 80-90 and PP <120 except on  it was 123  NST/CALEB wnl at  on Tuesday ,has appointment there today as well  IOL 2019 at 8 pm for delivery on  at 39 weeks due to [address]  GBS collected  Consents reviewed and signed  F/u 1 week

## 2019-12-09 ENCOUNTER — DOCUMENTATION (OUTPATIENT)
Dept: PERINATAL CARE | Facility: CLINIC | Age: 29
End: 2019-12-09

## 2019-12-09 LAB — EXTERNAL GROUP B STREP ANTIGEN: NEGATIVE

## 2019-12-09 NOTE — PROGRESS NOTES
Date:  19  RE: Armin Valencia    : 1990  Estimated Date of Delivery: 20  EGA: 36w1d  OB/GYN: William Mello MD    Insulin controlled GDM third trimester        Current regimen:  Levemir- 30 units at 9:00 or 10:00 pm daily (started night of Friday, 11/15)  1900 calorie meal plan 3 meals and 3 snacks with protein at each  SMBG fasting and 2 hours after the start of each meal via One Touch Verio glucose meter  Pt advised previously to reduce serving of carbohydrate at bedtime snack and increase serving of protein  Note: pt already incorporating up to 30 minute walk following dinner meal most days of the week  Plan:  Levemir- continue 30 units at 9:00 or 10:00 pm daily  Continue aiming for 15 gm CHO and 2-3 oz protein at bedtime snack  SMBG 4 x per day (Fasting, 2 hour after start of each meal) using OneTouch Verio glucose meter  Continue meal plan consisting of 3 meals and 3 snacks, including protein at each  If okay by OB, continue walking up to 20-30 minutes daily   US: Fetal Growth and CALEB normal  12/3 US: CALEB normal    Date due to report next:  Monday, 19 or sooner if have more frequent elevations      Tata Ferguson RD, LDN  Diabetes Educator   Diabetes and Pregnancy Program

## 2019-12-10 ENCOUNTER — ULTRASOUND (OUTPATIENT)
Dept: PERINATAL CARE | Facility: OTHER | Age: 29
End: 2019-12-10
Payer: COMMERCIAL

## 2019-12-10 VITALS
HEIGHT: 60 IN | BODY MASS INDEX: 32.39 KG/M2 | DIASTOLIC BLOOD PRESSURE: 81 MMHG | HEART RATE: 111 BPM | WEIGHT: 165 LBS | SYSTOLIC BLOOD PRESSURE: 115 MMHG

## 2019-12-10 DIAGNOSIS — Z3A.36 36 WEEKS GESTATION OF PREGNANCY: ICD-10-CM

## 2019-12-10 DIAGNOSIS — O24.414 INSULIN CONTROLLED GESTATIONAL DIABETES MELLITUS (GDM) IN THIRD TRIMESTER: Primary | ICD-10-CM

## 2019-12-10 DIAGNOSIS — O09.293 HISTORY OF PRE-ECLAMPSIA IN PRIOR PREGNANCY, CURRENTLY PREGNANT IN THIRD TRIMESTER: ICD-10-CM

## 2019-12-10 PROCEDURE — 76815 OB US LIMITED FETUS(S): CPT | Performed by: OBSTETRICS & GYNECOLOGY

## 2019-12-10 PROCEDURE — 59025 FETAL NON-STRESS TEST: CPT | Performed by: OBSTETRICS & GYNECOLOGY

## 2019-12-12 ENCOUNTER — ROUTINE PRENATAL (OUTPATIENT)
Dept: OBGYN CLINIC | Facility: CLINIC | Age: 29
End: 2019-12-12

## 2019-12-12 ENCOUNTER — ROUTINE PRENATAL (OUTPATIENT)
Dept: PERINATAL CARE | Facility: OTHER | Age: 29
End: 2019-12-12
Payer: COMMERCIAL

## 2019-12-12 VITALS
HEIGHT: 60 IN | DIASTOLIC BLOOD PRESSURE: 68 MMHG | SYSTOLIC BLOOD PRESSURE: 124 MMHG | WEIGHT: 165.8 LBS | HEART RATE: 100 BPM | BODY MASS INDEX: 32.55 KG/M2

## 2019-12-12 VITALS
WEIGHT: 166.4 LBS | SYSTOLIC BLOOD PRESSURE: 124 MMHG | HEIGHT: 60 IN | BODY MASS INDEX: 32.67 KG/M2 | DIASTOLIC BLOOD PRESSURE: 68 MMHG

## 2019-12-12 DIAGNOSIS — O24.414 INSULIN CONTROLLED GESTATIONAL DIABETES MELLITUS (GDM) IN THIRD TRIMESTER: Primary | ICD-10-CM

## 2019-12-12 DIAGNOSIS — Z3A.36 36 WEEKS GESTATION OF PREGNANCY: ICD-10-CM

## 2019-12-12 DIAGNOSIS — O09.293 HISTORY OF PRE-ECLAMPSIA IN PRIOR PREGNANCY, CURRENTLY PREGNANT IN THIRD TRIMESTER: ICD-10-CM

## 2019-12-12 DIAGNOSIS — O99.213 MATERNAL OBESITY, ANTEPARTUM, THIRD TRIMESTER: ICD-10-CM

## 2019-12-12 PROCEDURE — PNV: Performed by: OBSTETRICS & GYNECOLOGY

## 2019-12-12 PROCEDURE — 59025 FETAL NON-STRESS TEST: CPT | Performed by: OBSTETRICS & GYNECOLOGY

## 2019-12-12 NOTE — PROGRESS NOTES
Pt is a 30 y o   36w4d  Pregnancy is complicated by Gestational Diabetes A1, History of Preeclampsia and elevated inhibin with normal NIPT this pregnancy  Pt reports +FM  Denies vb, lof  Notes occasional ctx  PTL precautions and fkc reviewed  GBS negative and reviewed with patient  Reports with Levimir 30 units QHS FBG is mid 80's  PP breakfast and lunch 80-90's  PP dinner 100-110  IOL scheduled 2019 at 8 pm   F/U 1 week

## 2019-12-12 NOTE — PATIENT INSTRUCTIONS

## 2019-12-16 ENCOUNTER — ULTRASOUND (OUTPATIENT)
Dept: PERINATAL CARE | Facility: OTHER | Age: 29
End: 2019-12-16
Payer: COMMERCIAL

## 2019-12-16 ENCOUNTER — DOCUMENTATION (OUTPATIENT)
Dept: PERINATAL CARE | Facility: CLINIC | Age: 29
End: 2019-12-16

## 2019-12-16 VITALS
SYSTOLIC BLOOD PRESSURE: 127 MMHG | BODY MASS INDEX: 32.67 KG/M2 | DIASTOLIC BLOOD PRESSURE: 86 MMHG | WEIGHT: 166.4 LBS | HEIGHT: 60 IN | HEART RATE: 106 BPM

## 2019-12-16 DIAGNOSIS — O09.899 HIGH RISK PREGNANCY WITH HIGH INHIBIN: ICD-10-CM

## 2019-12-16 DIAGNOSIS — O09.293 HISTORY OF PRE-ECLAMPSIA IN PRIOR PREGNANCY, CURRENTLY PREGNANT IN THIRD TRIMESTER: ICD-10-CM

## 2019-12-16 DIAGNOSIS — O28.8 HIGH RISK PREGNANCY WITH HIGH INHIBIN: ICD-10-CM

## 2019-12-16 DIAGNOSIS — E66.9 OBESITY (BMI 30.0-34.9): ICD-10-CM

## 2019-12-16 DIAGNOSIS — Z3A.37 37 WEEKS GESTATION OF PREGNANCY: ICD-10-CM

## 2019-12-16 DIAGNOSIS — O24.414 INSULIN CONTROLLED GESTATIONAL DIABETES MELLITUS (GDM) IN THIRD TRIMESTER: Primary | ICD-10-CM

## 2019-12-16 PROCEDURE — 76815 OB US LIMITED FETUS(S): CPT | Performed by: OBSTETRICS & GYNECOLOGY

## 2019-12-16 PROCEDURE — 59025 FETAL NON-STRESS TEST: CPT | Performed by: OBSTETRICS & GYNECOLOGY

## 2019-12-16 NOTE — PROGRESS NOTES
Date:  19  RE: Corina Hurd    : 1990  Estimated Date of Delivery: 20  EGA: 37w1d  OB/GYN: Bernardo Hodges MD      Emailed     Insulin controlled GDM third trimester        Current regimen:  Levemir- 30 units at 9:00 or 10:00 pm daily (started night of Friday, 11/15)  1900 calorie meal plan 3 meals and 3 snacks with protein at each  SMBG fasting and 2 hours after the start of each meal via One Touch Verio glucose meter  Pt advised previously to reduce serving of carbohydrate at bedtime snack and increase serving of protein  Note: pt already incorporating up to 30 minute walk following dinner meal most days of the week  Plan:  Levemir- continue 30 units at 9:00 or 10:00 pm daily  Continue aiming for 15 gm CHO and 2-3 oz protein at bedtime snack  SMBG 4 x per day (Fasting, 2 hour after start of each meal) using OneTouch Verio glucose meter  Continue meal plan consisting of 3 meals and 3 snacks, including protein at each  If okay by OB, continue walking up to 20-30 minutes daily   US: Fetal Growth and CALEB normal  12/3 US: CALEB normal    Date due to report next:  Monday, 19 or sooner if have more frequent elevations      Diabetes Educator   Diabetes and Pregnancy Program

## 2019-12-16 NOTE — LETTER
December 16, 2019     Rupali Simone, [address]  [address]  [address] [facility]    Patient: Fadia Rudolph   YOB: 1990   Date of Visit: 12/16/2019       Dear Dr Evelin Leon: Thank you for referring Petey Walker to me for evaluation  Below are my notes for this consultation  If you have questions, please do not hesitate to call me  I look forward to following your patient along with you  Sincerely,        Favian Main MD        CC: No Recipients  Favian Main MD  12/16/2019  8:37 AM  Incomplete  Fetal testing is reactive     Favian Main MD

## 2019-12-16 NOTE — PROGRESS NOTES
Date:  19  RE: Terri Has    : 1990  Estimated Date of Delivery: 20  EGA: 37w1d  OB/GYN: Mike Araiza MD  Insulin controlled gestational diabetes        Emailed     Current regimen:  Levemir- 30 units at 9:00 or 10:00 pm daily (started night of Friday, 11/15)  1900 calorie meal plan 3 meals and 3 snacks with protein at each  Has limited her bedtime snack to 1 CHO serving & 2 oz protein  SMBG fasting and 2 hours after the start of each meal via One Touch Verio glucose meter  Pt advised previously to reduce serving of carbohydrate at bedtime snack and increase serving of protein  Patient  incorporating up to 30 minute walk following dinner meal most days of the week  Plan:  Levemir- continue 30 units at 9:00 or 10:00 pm daily  Continue diet & BG testing   US: Fetal Growth and CALEB normal  12/3 US: CALEB normal    Date due to report next:  Monday, 19 or sooner if have more frequent elevations      Aayush Flores, MS, RD, CDE  Diabetes Educator   Diabetes and Pregnancy Program

## 2019-12-19 ENCOUNTER — ROUTINE PRENATAL (OUTPATIENT)
Dept: PERINATAL CARE | Facility: OTHER | Age: 29
End: 2019-12-19
Payer: COMMERCIAL

## 2019-12-19 ENCOUNTER — ROUTINE PRENATAL (OUTPATIENT)
Dept: OBGYN CLINIC | Facility: CLINIC | Age: 29
End: 2019-12-19

## 2019-12-19 VITALS
HEART RATE: 86 BPM | OXYGEN SATURATION: 99 % | DIASTOLIC BLOOD PRESSURE: 70 MMHG | SYSTOLIC BLOOD PRESSURE: 114 MMHG | BODY MASS INDEX: 32.38 KG/M2 | WEIGHT: 165.8 LBS

## 2019-12-19 VITALS
BODY MASS INDEX: 32.43 KG/M2 | DIASTOLIC BLOOD PRESSURE: 72 MMHG | HEIGHT: 60 IN | SYSTOLIC BLOOD PRESSURE: 120 MMHG | HEART RATE: 92 BPM | WEIGHT: 165.2 LBS

## 2019-12-19 DIAGNOSIS — O09.899 HIGH RISK PREGNANCY WITH HIGH INHIBIN: ICD-10-CM

## 2019-12-19 DIAGNOSIS — O09.293 HISTORY OF PRE-ECLAMPSIA IN PRIOR PREGNANCY, CURRENTLY PREGNANT IN THIRD TRIMESTER: ICD-10-CM

## 2019-12-19 DIAGNOSIS — O28.8 HIGH RISK PREGNANCY WITH HIGH INHIBIN: ICD-10-CM

## 2019-12-19 DIAGNOSIS — O24.414 INSULIN CONTROLLED GESTATIONAL DIABETES MELLITUS (GDM) IN THIRD TRIMESTER: Primary | ICD-10-CM

## 2019-12-19 DIAGNOSIS — Z3A.37 37 WEEKS GESTATION OF PREGNANCY: ICD-10-CM

## 2019-12-19 PROCEDURE — 59025 FETAL NON-STRESS TEST: CPT | Performed by: OBSTETRICS & GYNECOLOGY

## 2019-12-19 PROCEDURE — PNV: Performed by: NURSE PRACTITIONER

## 2019-12-19 NOTE — PATIENT INSTRUCTIONS
Please communicate your blood sugars at least weekly with the diabetic educators at the [address] [place] [address] Diabetes in Pregnancy program   The telephone number is [phone]  The e-mail address is [email or web address]  If you do not hear back from the program within 48 hours of sending your blood sugars, please call [facility] at [phone]  Thank you

## 2019-12-19 NOTE — PROGRESS NOTES
29 yo  at 78Z7M  complicated by insulin controlled GDM, hx pre-eclampsia, elevated inhibin w/ normal NIPT    FG 79-90, 2 hr PP ; managing well  She has been doing twice weekly NSTs w/ weekly CALEB at [facility]  Has been having UCs that resolve with rest and fluids  Baby has been active  No LOF or VB     S=D, normal FHTs, normal BP  Cervix 50/ 2/ -3    Labor precautions reinforced    RV 1 wk

## 2019-12-23 ENCOUNTER — OFFICE VISIT (OUTPATIENT)
Dept: PERINATAL CARE | Facility: OTHER | Age: 29
End: 2019-12-23
Payer: COMMERCIAL

## 2019-12-23 ENCOUNTER — DOCUMENTATION (OUTPATIENT)
Dept: PERINATAL CARE | Facility: CLINIC | Age: 29
End: 2019-12-23

## 2019-12-23 VITALS
DIASTOLIC BLOOD PRESSURE: 80 MMHG | SYSTOLIC BLOOD PRESSURE: 112 MMHG | WEIGHT: 165.6 LBS | HEART RATE: 115 BPM | HEIGHT: 60 IN | BODY MASS INDEX: 32.51 KG/M2

## 2019-12-23 DIAGNOSIS — O24.414 INSULIN CONTROLLED GESTATIONAL DIABETES MELLITUS (GDM) IN THIRD TRIMESTER: Primary | ICD-10-CM

## 2019-12-23 DIAGNOSIS — Z3A.38 38 WEEKS GESTATION OF PREGNANCY: ICD-10-CM

## 2019-12-23 PROCEDURE — 59025 FETAL NON-STRESS TEST: CPT | Performed by: OBSTETRICS & GYNECOLOGY

## 2019-12-23 NOTE — PROGRESS NOTES
Date:  19  RE: Star Berg    : 1990  Estimated Date of Delivery: 20  EGA: 38w1d  OB/GYN: Josie Baker MD  Insulin controlled gestational diabetes        Emailed     Current regimen:  Levemir- 30 units at 9:00 or 10:00 pm daily (started night of Friday, 11/15)  1900 calorie meal plan 3 meals and 3 snacks with protein at each  Has limited her bedtime snack to 1 CHO serving & 2 oz protein  SMBG fasting and 2 hours after the start of each meal via One Touch Verio glucose meter  Pt advised previously to reduce serving of carbohydrate at bedtime snack and increase serving of protein  Patient  incorporating up to 30 minute walk following dinner meal most days of the week  Plan:  Levemir- continue 30 units at 9:00 or 10:00 pm daily  Continue diet and SMBG testing  19 US: fetal growth appeared normal and CALEB normal   NST twice a week and CALEB weekly  Patient reports induction scheduled for 19, recommended injecting half of Levemir dose night before induction but to discuss with her OBGYN  Date due to report next:  , 19 or sooner if needed  Last report       MARYJANE Francisco, CDE  Diabetes Educator   Diabetes and Pregnancy Program

## 2019-12-23 NOTE — PROGRESS NOTES
[address]: Ms Shay Becerra was seen today at 38w1d for NST (found under the pregnancy episode) which I reviewed the RN assessment and agree  Please don't hesitate to contact our office with any concerns or questions    Dusty Sethi MD

## 2019-12-26 ENCOUNTER — TELEPHONE (OUTPATIENT)
Dept: PERINATAL CARE | Facility: OTHER | Age: 29
End: 2019-12-26

## 2019-12-26 ENCOUNTER — ROUTINE PRENATAL (OUTPATIENT)
Dept: OBGYN CLINIC | Facility: CLINIC | Age: 29
End: 2019-12-26

## 2019-12-26 ENCOUNTER — APPOINTMENT (OUTPATIENT)
Dept: PERINATAL CARE | Facility: OTHER | Age: 29
End: 2019-12-26
Payer: COMMERCIAL

## 2019-12-26 ENCOUNTER — HOSPITAL ENCOUNTER (OUTPATIENT)
Facility: HOSPITAL | Age: 29
Discharge: HOME | End: 2019-12-26
Attending: OBSTETRICS & GYNECOLOGY | Admitting: OBSTETRICS & GYNECOLOGY
Payer: COMMERCIAL

## 2019-12-26 ENCOUNTER — ULTRASOUND (OUTPATIENT)
Dept: PERINATAL CARE | Facility: OTHER | Age: 29
End: 2019-12-26
Payer: COMMERCIAL

## 2019-12-26 VITALS
WEIGHT: 165.2 LBS | DIASTOLIC BLOOD PRESSURE: 74 MMHG | SYSTOLIC BLOOD PRESSURE: 122 MMHG | BODY MASS INDEX: 32.43 KG/M2 | HEIGHT: 60 IN

## 2019-12-26 VITALS
HEART RATE: 118 BPM | RESPIRATION RATE: 18 BRPM | DIASTOLIC BLOOD PRESSURE: 78 MMHG | WEIGHT: 165 LBS | TEMPERATURE: 98.7 F | SYSTOLIC BLOOD PRESSURE: 118 MMHG | BODY MASS INDEX: 32.22 KG/M2

## 2019-12-26 VITALS
HEIGHT: 60 IN | HEART RATE: 98 BPM | DIASTOLIC BLOOD PRESSURE: 80 MMHG | BODY MASS INDEX: 32.51 KG/M2 | WEIGHT: 165.6 LBS | SYSTOLIC BLOOD PRESSURE: 122 MMHG

## 2019-12-26 DIAGNOSIS — O24.414 INSULIN CONTROLLED GESTATIONAL DIABETES MELLITUS (GDM) IN THIRD TRIMESTER: Primary | ICD-10-CM

## 2019-12-26 DIAGNOSIS — O09.293 HISTORY OF PRE-ECLAMPSIA IN PRIOR PREGNANCY, CURRENTLY PREGNANT IN THIRD TRIMESTER: ICD-10-CM

## 2019-12-26 DIAGNOSIS — Z3A.38 38 WEEKS GESTATION OF PREGNANCY: ICD-10-CM

## 2019-12-26 DIAGNOSIS — O28.8 HIGH RISK PREGNANCY WITH HIGH INHIBIN: ICD-10-CM

## 2019-12-26 DIAGNOSIS — E66.9 OBESITY (BMI 30.0-34.9): ICD-10-CM

## 2019-12-26 DIAGNOSIS — O09.899 HIGH RISK PREGNANCY WITH HIGH INHIBIN: ICD-10-CM

## 2019-12-26 DIAGNOSIS — O99.213 MATERNAL OBESITY, ANTEPARTUM, THIRD TRIMESTER: ICD-10-CM

## 2019-12-26 PROCEDURE — 59025 FETAL NON-STRESS TEST: CPT | Performed by: OBSTETRICS & GYNECOLOGY

## 2019-12-26 PROCEDURE — 99212 OFFICE O/P EST SF 10 MIN: CPT

## 2019-12-26 PROCEDURE — 76816 OB US FOLLOW-UP PER FETUS: CPT | Performed by: OBSTETRICS & GYNECOLOGY

## 2019-12-26 PROCEDURE — PNV: Performed by: OBSTETRICS & GYNECOLOGY

## 2019-12-26 PROCEDURE — NC001 PR NO CHARGE: Performed by: OBSTETRICS & GYNECOLOGY

## 2019-12-26 NOTE — PROGRESS NOTES
Please refer to the [facility] ultrasound report in Ob Procedures for additional information regarding the visit to the [facility]  today

## 2019-12-26 NOTE — PROGRESS NOTES
Pt is a 30 y o   38w4d  Pregnancy is complicated by Gestational Diabetes A2, History of Preeclampsia and elevated inhibin with normal NIPT  Pt reports +FM  Denies vb, lof, ctx  PTL precautions and fkc reviewed  SVE: 2-3/50/-2, soft, anterior   Membranes stripped per pt request  FBG 80-90; pp <120 except one on x-mas randy which was 127  IOL scheduled Saturday at 8 pm

## 2019-12-26 NOTE — LETTER
December 26, 2019     Vane Torres MD  [address]  [facility] [employer]  [facility] [address] [place] [address]    Patient: Taylor Uribe   YOB: 1990   Date of Visit: 12/26/2019       Dear Dr Nav Lyons: Thank you for referring Indu Rabago to me for evaluation  Below are my notes for this consultation  If you have questions, please do not hesitate to call me  I look forward to following your patient along with you  Sincerely,        Juan Jose Gallegos MD        CC: No Recipients  Juan Jose Gallegos MD  12/26/2019 10:45 AM  Sign at close encounter  Please refer to the [facility] ultrasound report in Ob Procedures for additional information regarding the visit to the [facility]  today

## 2019-12-27 ENCOUNTER — ANESTHESIA EVENT (OUTPATIENT)
Dept: ANESTHESIOLOGY | Facility: HOSPITAL | Age: 29
End: 2019-12-27

## 2019-12-27 ENCOUNTER — ANESTHESIA (OUTPATIENT)
Dept: ANESTHESIOLOGY | Facility: HOSPITAL | Age: 29
End: 2019-12-27

## 2019-12-27 ENCOUNTER — HOSPITAL ENCOUNTER (INPATIENT)
Facility: HOSPITAL | Age: 29
LOS: 1 days | Discharge: HOME | End: 2019-12-28
Attending: OBSTETRICS & GYNECOLOGY | Admitting: OBSTETRICS & GYNECOLOGY
Payer: COMMERCIAL

## 2019-12-27 DIAGNOSIS — O24.414 INSULIN CONTROLLED GESTATIONAL DIABETES MELLITUS (GDM) IN THIRD TRIMESTER: ICD-10-CM

## 2019-12-27 DIAGNOSIS — Z3A.38 38 WEEKS GESTATION OF PREGNANCY: ICD-10-CM

## 2019-12-27 LAB
ABO GROUP BLD: NORMAL
ALBUMIN SERPL BCP-MCNC: 2.8 G/DL (ref 3.5–5)
ALP SERPL-CCNC: 319 U/L (ref 46–116)
ALT SERPL W P-5'-P-CCNC: 16 U/L (ref 12–78)
ANION GAP SERPL CALCULATED.3IONS-SCNC: 15 MMOL/L (ref 4–13)
AST SERPL W P-5'-P-CCNC: 21 U/L (ref 5–45)
BASE EXCESS BLDCOA CALC-SCNC: -4.3 MMOL/L (ref 3–11)
BASE EXCESS BLDCOV CALC-SCNC: -4.9 MMOL/L (ref 1–9)
BASOPHILS # BLD AUTO: 0.06 THOUSANDS/ΜL (ref 0–0.1)
BASOPHILS NFR BLD AUTO: 0 % (ref 0–1)
BILIRUB SERPL-MCNC: 0.93 MG/DL (ref 0.2–1)
BLD GP AB SCN SERPL QL: NEGATIVE
BUN SERPL-MCNC: 8 MG/DL (ref 5–25)
CALCIUM SERPL-MCNC: 9.1 MG/DL (ref 8.3–10.1)
CHLORIDE SERPL-SCNC: 102 MMOL/L (ref 100–108)
CO2 SERPL-SCNC: 19 MMOL/L (ref 21–32)
CREAT SERPL-MCNC: 0.39 MG/DL (ref 0.6–1.3)
EOSINOPHIL # BLD AUTO: 0.06 THOUSAND/ΜL (ref 0–0.61)
EOSINOPHIL NFR BLD AUTO: 0 % (ref 0–6)
ERYTHROCYTE [DISTWIDTH] IN BLOOD BY AUTOMATED COUNT: 13.5 % (ref 11.6–15.1)
GFR SERPL CREATININE-BSD FRML MDRD: 140 ML/MIN/1.73SQ M
GLUCOSE SERPL-MCNC: 120 MG/DL (ref 65–140)
GLUCOSE SERPL-MCNC: 124 MG/DL (ref 65–140)
GLUCOSE SERPL-MCNC: 130 MG/DL (ref 65–140)
HCO3 BLDCOA-SCNC: 22.6 MMOL/L (ref 17.3–27.3)
HCO3 BLDCOV-SCNC: 19.5 MMOL/L (ref 12.2–28.6)
HCT VFR BLD AUTO: 43.2 % (ref 34.8–46.1)
HGB BLD-MCNC: 14.4 G/DL (ref 11.5–15.4)
IMM GRANULOCYTES # BLD AUTO: 0.13 THOUSAND/UL (ref 0–0.2)
IMM GRANULOCYTES NFR BLD AUTO: 1 % (ref 0–2)
LYMPHOCYTES # BLD AUTO: 1.6 THOUSANDS/ΜL (ref 0.6–4.47)
LYMPHOCYTES NFR BLD AUTO: 8 % (ref 14–44)
MCH RBC QN AUTO: 28.6 PG (ref 26.8–34.3)
MCHC RBC AUTO-ENTMCNC: 33.3 G/DL (ref 31.4–37.4)
MCV RBC AUTO: 86 FL (ref 82–98)
MONOCYTES # BLD AUTO: 0.71 THOUSAND/ΜL (ref 0.17–1.22)
MONOCYTES NFR BLD AUTO: 4 % (ref 4–12)
NEUTROPHILS # BLD AUTO: 17.11 THOUSANDS/ΜL (ref 1.85–7.62)
NEUTS SEG NFR BLD AUTO: 87 % (ref 43–75)
NRBC BLD AUTO-RTO: 0 /100 WBCS
O2 CT VFR BLDCOA CALC: 9.6 ML/DL
OXYHGB MFR BLDCOA: 42 %
OXYHGB MFR BLDCOV: 77.8 %
PCO2 BLDCOA: 47.7 MM[HG] (ref 30–60)
PCO2 BLDCOV: 34.9 MM HG (ref 27–43)
PH BLDCOA: 7.29 [PH] (ref 7.23–7.43)
PH BLDCOV: 7.37 [PH] (ref 7.19–7.49)
PLATELET # BLD AUTO: 216 THOUSANDS/UL (ref 149–390)
PMV BLD AUTO: 10.4 FL (ref 8.9–12.7)
PO2 BLDCOA: 19 MM HG (ref 5–25)
PO2 BLDCOV: 33.1 MM HG (ref 15–45)
POTASSIUM SERPL-SCNC: 3.7 MMOL/L (ref 3.5–5.3)
PROT SERPL-MCNC: 7.1 G/DL (ref 6.4–8.2)
RBC # BLD AUTO: 5.03 MILLION/UL (ref 3.81–5.12)
RH BLD: POSITIVE
RPR SER QL: NORMAL
SAO2 % BLDCOV: 17.4 ML/DL
SODIUM SERPL-SCNC: 136 MMOL/L (ref 136–145)
SPECIMEN EXPIRATION DATE: NORMAL
WBC # BLD AUTO: 19.67 THOUSAND/UL (ref 4.31–10.16)

## 2019-12-27 PROCEDURE — 59409 OBSTETRICAL CARE: CPT | Performed by: OBSTETRICS & GYNECOLOGY

## 2019-12-27 PROCEDURE — 86900 BLOOD TYPING SEROLOGIC ABO: CPT | Performed by: OBSTETRICS & GYNECOLOGY

## 2019-12-27 PROCEDURE — 85025 COMPLETE CBC W/AUTO DIFF WBC: CPT | Performed by: OBSTETRICS & GYNECOLOGY

## 2019-12-27 PROCEDURE — 99024 POSTOP FOLLOW-UP VISIT: CPT | Performed by: OBSTETRICS & GYNECOLOGY

## 2019-12-27 PROCEDURE — 88307 TISSUE EXAM BY PATHOLOGIST: CPT | Performed by: PATHOLOGY

## 2019-12-27 PROCEDURE — 80053 COMPREHEN METABOLIC PANEL: CPT | Performed by: OBSTETRICS & GYNECOLOGY

## 2019-12-27 PROCEDURE — 86901 BLOOD TYPING SEROLOGIC RH(D): CPT | Performed by: OBSTETRICS & GYNECOLOGY

## 2019-12-27 PROCEDURE — 82948 REAGENT STRIP/BLOOD GLUCOSE: CPT

## 2019-12-27 PROCEDURE — 82805 BLOOD GASES W/O2 SATURATION: CPT | Performed by: OBSTETRICS & GYNECOLOGY

## 2019-12-27 PROCEDURE — 0KQM0ZZ REPAIR PERINEUM MUSCLE, OPEN APPROACH: ICD-10-PCS | Performed by: OBSTETRICS & GYNECOLOGY

## 2019-12-27 PROCEDURE — 86850 RBC ANTIBODY SCREEN: CPT | Performed by: OBSTETRICS & GYNECOLOGY

## 2019-12-27 PROCEDURE — 86592 SYPHILIS TEST NON-TREP QUAL: CPT | Performed by: OBSTETRICS & GYNECOLOGY

## 2019-12-27 PROCEDURE — 99211 OFF/OP EST MAY X REQ PHY/QHP: CPT

## 2019-12-27 PROCEDURE — 4A1HXCZ MONITORING OF PRODUCTS OF CONCEPTION, CARDIAC RATE, EXTERNAL APPROACH: ICD-10-PCS | Performed by: OBSTETRICS & GYNECOLOGY

## 2019-12-27 RX ORDER — DIPHENHYDRAMINE HCL 25 MG
25 TABLET ORAL EVERY 6 HOURS PRN
Status: DISCONTINUED | OUTPATIENT
Start: 2019-12-27 | End: 2019-12-28 | Stop reason: HOSPADM

## 2019-12-27 RX ORDER — ONDANSETRON 2 MG/ML
4 INJECTION INTRAMUSCULAR; INTRAVENOUS EVERY 8 HOURS PRN
Status: DISCONTINUED | OUTPATIENT
Start: 2019-12-27 | End: 2019-12-28 | Stop reason: HOSPADM

## 2019-12-27 RX ORDER — OXYTOCIN/RINGER'S LACTATE 30/500 ML
250 PLASTIC BAG, INJECTION (ML) INTRAVENOUS CONTINUOUS
Status: ACTIVE | OUTPATIENT
Start: 2019-12-27 | End: 2019-12-27

## 2019-12-27 RX ORDER — DIPHENHYDRAMINE HYDROCHLORIDE 50 MG/ML
25 INJECTION INTRAMUSCULAR; INTRAVENOUS EVERY 6 HOURS PRN
Status: DISCONTINUED | OUTPATIENT
Start: 2019-12-27 | End: 2019-12-27

## 2019-12-27 RX ORDER — BUPIVACAINE HYDROCHLORIDE 2.5 MG/ML
INJECTION, SOLUTION EPIDURAL; INFILTRATION; INTRACAUDAL
Status: COMPLETED
Start: 2019-12-27 | End: 2019-12-27

## 2019-12-27 RX ORDER — LIDOCAINE HYDROCHLORIDE 20 MG/ML
INJECTION, SOLUTION EPIDURAL; INFILTRATION; INTRACAUDAL; PERINEURAL
Status: COMPLETED
Start: 2019-12-27 | End: 2019-12-27

## 2019-12-27 RX ORDER — OXYCODONE HYDROCHLORIDE AND ACETAMINOPHEN 5; 325 MG/1; MG/1
2 TABLET ORAL EVERY 4 HOURS PRN
Status: DISCONTINUED | OUTPATIENT
Start: 2019-12-27 | End: 2019-12-28 | Stop reason: HOSPADM

## 2019-12-27 RX ORDER — ACETAMINOPHEN 325 MG/1
650 TABLET ORAL EVERY 6 HOURS PRN
Status: DISCONTINUED | OUTPATIENT
Start: 2019-12-27 | End: 2019-12-28 | Stop reason: HOSPADM

## 2019-12-27 RX ORDER — IBUPROFEN 600 MG/1
600 TABLET ORAL EVERY 6 HOURS PRN
Status: DISCONTINUED | OUTPATIENT
Start: 2019-12-27 | End: 2019-12-28 | Stop reason: HOSPADM

## 2019-12-27 RX ORDER — CALCIUM CARBONATE 200(500)MG
1000 TABLET,CHEWABLE ORAL DAILY PRN
Status: DISCONTINUED | OUTPATIENT
Start: 2019-12-27 | End: 2019-12-28 | Stop reason: HOSPADM

## 2019-12-27 RX ORDER — OXYTOCIN/RINGER'S LACTATE 30/500 ML
PLASTIC BAG, INJECTION (ML) INTRAVENOUS
Status: COMPLETED
Start: 2019-12-27 | End: 2019-12-27

## 2019-12-27 RX ORDER — OXYCODONE HYDROCHLORIDE AND ACETAMINOPHEN 5; 325 MG/1; MG/1
1 TABLET ORAL EVERY 4 HOURS PRN
Status: DISCONTINUED | OUTPATIENT
Start: 2019-12-27 | End: 2019-12-28 | Stop reason: HOSPADM

## 2019-12-27 RX ORDER — SODIUM CHLORIDE 9 MG/ML
125 INJECTION, SOLUTION INTRAVENOUS CONTINUOUS
Status: DISCONTINUED | OUTPATIENT
Start: 2019-12-27 | End: 2019-12-28 | Stop reason: HOSPADM

## 2019-12-27 RX ORDER — DOCUSATE SODIUM 100 MG/1
100 CAPSULE, LIQUID FILLED ORAL 2 TIMES DAILY
Status: DISCONTINUED | OUTPATIENT
Start: 2019-12-27 | End: 2019-12-28 | Stop reason: HOSPADM

## 2019-12-27 RX ORDER — ROPIVACAINE HYDROCHLORIDE 2 MG/ML
INJECTION, SOLUTION EPIDURAL; INFILTRATION; PERINEURAL
Status: DISPENSED
Start: 2019-12-27 | End: 2019-12-27

## 2019-12-27 RX ADMIN — LIDOCAINE HYDROCHLORIDE 100 MG: 20 INJECTION, SOLUTION EPIDURAL; INFILTRATION; INTRACAUDAL; PERINEURAL at 02:45

## 2019-12-27 RX ADMIN — DOCUSATE SODIUM 100 MG: 100 CAPSULE, LIQUID FILLED ORAL at 17:40

## 2019-12-27 RX ADMIN — BENZOCAINE AND LEVOMENTHOL: 200; 5 SPRAY TOPICAL at 04:59

## 2019-12-27 RX ADMIN — IBUPROFEN 600 MG: 600 TABLET ORAL at 10:55

## 2019-12-27 RX ADMIN — IBUPROFEN 600 MG: 600 TABLET ORAL at 04:58

## 2019-12-27 RX ADMIN — SODIUM CHLORIDE 125 ML/HR: 0.9 INJECTION, SOLUTION INTRAVENOUS at 01:46

## 2019-12-27 RX ADMIN — IBUPROFEN 600 MG: 600 TABLET ORAL at 17:39

## 2019-12-27 RX ADMIN — DOCUSATE SODIUM 100 MG: 100 CAPSULE, LIQUID FILLED ORAL at 08:25

## 2019-12-27 RX ADMIN — BUPIVACAINE HYDROCHLORIDE 10 ML: 2.5 INJECTION, SOLUTION EPIDURAL; INFILTRATION; INTRACAUDAL at 02:45

## 2019-12-27 RX ADMIN — LIDOCAINE HYDROCHLORIDE 100 MG: 20 INJECTION, SOLUTION EPIDURAL; INFILTRATION; INTRACAUDAL; PERINEURAL at 02:30

## 2019-12-27 RX ADMIN — Medication 30 UNITS: at 02:27

## 2019-12-27 RX ADMIN — WITCH HAZEL 1 PAD: 500 SOLUTION RECTAL; TOPICAL at 04:58

## 2019-12-27 NOTE — PLAN OF CARE
Problem: DISCHARGE PLANNING - CARE MANAGEMENT  Goal: Discharge to post-acute care or home with appropriate resources  Description  INTERVENTIONS:  - Conduct assessment to determine patient/family and health care team treatment goals, and need for post-acute services based on payer coverage, community resources, and patient preferences, and barriers to discharge  - Address psychosocial, clinical, and financial barriers to discharge as identified in assessment in conjunction with the patient/family and health care team  - Arrange appropriate level of post-acute services according to patients   needs and preference and payer coverage in collaboration with the physician and health care team  - Communicate with and update the patient/family, physician, and health care team regarding progress on the discharge plan  - Arrange appropriate transportation to post-acute venues  Outcome: Progressing     Problem: POSTPARTUM  Goal: Experiences normal postpartum course  Description  INTERVENTIONS:  - Monitor maternal vital signs  - Assess uterine involution and lochia  Outcome: Progressing  Goal: Appropriate maternal -  bonding  Description  INTERVENTIONS:  - Identify family support  - Assess for appropriate maternal/infant bonding   -Encourage maternal/infant bonding opportunities  - Referral to  or  as needed  Outcome: Progressing  Goal: Establishment of infant feeding pattern  Description  INTERVENTIONS:  - Assess breast/bottle feeding  - Refer to lactation as needed  Outcome: Progressing  Goal: Incision(s), wounds(s) or drain site(s) healing without S/S of infection  Description  INTERVENTIONS  - Assess and document risk factors for skin impairment   - Assess and document dressing, incision, wound bed, drain sites and surrounding tissue  - Consider nutrition services referral as needed  - Oral mucous membranes remain intact  - Provide patient/ family education  Outcome: Progressing

## 2019-12-27 NOTE — PLAN OF CARE
Problem: BIRTH - VAGINAL/ SECTION  Goal: Fetal and maternal status remain reassuring during the birth process  Description  INTERVENTIONS:  - Monitor vital signs  - Monitor fetal heart rate  - Monitor uterine activity  - Monitor labor progression (vaginal delivery)  - DVT prophylaxis  - Antibiotic prophylaxis  Outcome: Progressing  Goal: Emotionally satisfying birthing experience for mother/fetus  Description  Interventions:  - Assess, plan, implement and evaluate the nursing care given to the patient in labor  - Advocate the philosophy that each childbirth experience is a unique experience and support the family's chosen level of involvement and control during the labor process   - Actively participate in both the patient's and family's teaching of the birth process  - Consider cultural, Orthodox and age-specific factors and plan care for the patient in labor  Outcome: Progressing     Problem: Labor & Delivery  Goal: Manages discomfort  Description  Assess and monitor for signs and symptoms of discomfort  Assess patient's pain level regularly and per hospital policy  Administer medications as ordered  Support use of nonpharmacological methods to help control pain such as distraction, imagery, relaxation, and application of heat and cold  Collaborate with interdisciplinary team and patient to determine appropriate pain management plan  1  Include patient in decisions related to comfort  2  Offer non-pharmacological pain management interventions  3  Report ineffective pain management to physician  Outcome: Progressing  Goal: Patient vital signs are stable  Description  1  Assess vital signs - vaginal delivery    Outcome: Progressing

## 2019-12-27 NOTE — H&P
H&P Exam - Obstetrics   Dakota Branch Olga Brice 30 y o  female MRN: [id number]  Unit/Bed#: L&D 326-01 Encounter: [id number]      History of Present Illness     Chief Complaint: Active labor    HPI:  Estee Bell is a 30 y o   female with an MORRIS of 2020, by Last Menstrual Period at 38w5d weeks gestation who is being admitted for labor  She decline LOF, vaginal bleeding and reporting good fetal movements  Pregnancy complicated by A2 GDM which is insulin controlled, history of preeclampsia  Contractions: yes  Loss of fluid:  No  Vaginal bleeding: no  Fetal movement:  Good    She is Dr Nunu Mcknight patient  PREGNANCY COMPLICATIONS:   Patient Active Problem List   Diagnosis    Obesity (BMI 30 0-34  9)    Dietary calcium deficiency    Abnormal genetic test during pregnancy    Maternal obesity, antepartum, third trimester    High risk pregnancy with high inhibin    Insulin controlled gestational diabetes mellitus (GDM) in third trimester    38 weeks gestation of pregnancy    History of pre-eclampsia in prior pregnancy, currently pregnant in third trimester       OB History    Para Term  AB Living   2 1 1     1   SAB TAB Ectopic Multiple Live Births         0 1      # Outcome Date GA Lbr George/2nd Weight Sex Delivery Anes PTL Lv   2 Current            1 Term 17 38w4d / 00:44 2977 g (6 lb 9 oz) M Vag-Spont EPI N JOE      Obstetric Comments   Menarche: 10   25-28/4/regular tampon every 6 hours       Baby complications/comments: none    Review of Systems   Constitutional: Negative  HENT: Negative  Respiratory: Negative  Cardiovascular: Negative  Gastrointestinal: Negative  Genitourinary: Negative  Musculoskeletal: Negative  Neurological: Negative            Historical Information   Past Medical History:   Diagnosis Date    Diet controlled gestational diabetes mellitus (GDM) in third trimester 10/21/2019    Gestational hypertension     during 3rd trimester, induced    History of pre-eclampsia in prior pregnancy, currently pregnant in second trimester 6/19/2019    Need for HPV vaccination     completed series    Varicella     Visual impairment      Past Surgical History:   Procedure Laterality Date    WISDOM TOOTH EXTRACTION       Social History   Social History     Substance and Sexual Activity   Alcohol Use Not Currently    Comment: occasionally ; none while pregnant     Social History     Substance and Sexual Activity   Drug Use No     Social History     Tobacco Use   Smoking Status Never Smoker   Smokeless Tobacco Never Used     Family History: non-contributory    Meds/Allergies      Medications Prior to Admission   Medication    aspirin (ECOTRIN LOW STRENGTH) 81 mg EC tablet    insulin detemir (LEVEMIR FLEXTOUCH) 100 Units/mL injection pen    Insulin Pen Needle 31G X 5 MM MISAdifyUCH DELICA LANCETS 82R MISC    ONETOUCH VERIO test strip    Prenatal MV-Min-Fe Fum-FA-DHA (PRENATAL 1 PO)      No Known Allergies    OBJECTIVE:    Vitals: Temperature 98 1 °F (36 7 °C), last menstrual period 03/31/2019, currently breastfeeding  There is no height or weight on file to calculate BMI  Physical Exam   Constitutional: She is oriented to person, place, and time  She appears well-developed and well-nourished  No distress  Cardiovascular: Normal rate and regular rhythm  Pulmonary/Chest: Effort normal    Abdominal: Soft  Musculoskeletal: Normal range of motion  Neurological: She is alert and oriented to person, place, and time  Skin: Skin is warm  She is not diaphoretic  Psychiatric: She has a normal mood and affect  Her behavior is normal          [place]: deferred  Nitrazine: deferred    Cervix:  Dilation: 6-7  Effacement (%): 90  Station: -1  Cervical Characteristics: Anterior    Fetal heart rate:    130 cat 1     [place]:    3 minutes    EFW: 7 6    GBS: Negative    Prenatal Labs: I have personally reviewed pertinent reports    , Blood Type:   Lab Results   Component Value Date/Time    ABO Grouping A 2019    ABO Grouping A 2017 01:14 PM    D (Rh type):   Lab Results   Component Value Date/Time    Rh Factor Positive 2017 01:14 PM   HCT/HGB:   Lab Results   Component Value Date/Time    HCT 37 0 10/15/2019 05:48 AM    Hematocrit 40 4 2017 09:47 AM    Hematocrit 38 2 2016 07:46 AM    Hemoglobin 11 9 10/15/2019 05:48 AM    Hemoglobin 14 0 2017 09:47 AM    Hemoglobin 12 7 2016 07:46 AM    External Hemoglobin 11 9 10/15/2019     MCV:   Lab Results   Component Value Date/Time    MCV 87 9 10/15/2019 05:48 AM    MCV 84 2017 09:47 AM    MCV 85 7 2016 07:46 AM     Platelets:   Lab Results   Component Value Date/Time    Platelet Count 505  05:48 AM    Platelets 255  09:47 AM    Platelets 050  07:46 AM    External Platelets 482      1 hour Glucola:   Lab Results   Component Value Date/Time    GLUCOSE 1 HR 50 GM GLUC CHALLENGE-PREG  2016 07:46 AM    Glucose 156 (H) 10/15/2019 05:48 AM    3 hour GTT:   Lab Results   Component Value Date/Time    Glucose, GTT - 3 Hour 147 10/18/2019    VDRL/RPR:   Lab Results   Component Value Date/Time    RPR SCREEN NON-REACTIVE 2016 07:46 AM    RPR NON-REACTIVE 10/15/2019 05:48 AM    RPR Non-Reactive 2017 01:15 PM     Hep B:   Lab Results   Component Value Date/Time    Hepatitis B Surface Ag negative 2019    Chlamydia:   Lab Results   Component Value Date/Time    External Chlamydia Screen negative 2019         Assessment/Plan     ASSESSMENT:  23yo  at 38w5d weeks gestation who is being admitted for labor      PLAN:   1) Admit   2) CBC, RPR, Blood Type   3) Start with expectant management    4) GBS negative  status: no PCN for prophylaxis    5) Analgesia and/or epidural at patient request   6) Anticipate    7) Discussed with Dr Roberth Walsh      This patient will be an INPATIENT  and I certify the anticipated length of stay is >2 Midnights      Susan Thurston MD  36/38/4269  1:39 AM

## 2019-12-27 NOTE — PROGRESS NOTES
L&D Triage Note - OB/GYN  María Hoover Olga Campos 30 y o  female MRN: [id number]  Unit/Bed#: L&D 326-01 Encounter: [id number]      Assessment:  30 y o   at 38w4d rule out labor    Plan:  1  38w4d IUP rule out labor  - She had membrane stripped today  - SVE 3/70/-2  Repeat in 2 hours 3/70/-2  - /cat 1 toco 3-6 minutes  - Vitals signs check  - Labor precautions discussed; in case of following signs and symptoms patient need to call her doctor or come to hospital vaginal bleeding, fluid leakage, contractions, decreased fetal movements  - patient all questions answered    2  Gestational diabetes insulin controlled  - continue same regimen of insulin    3        ______________________________________________________________________      Chief Compliant:  Contractions    TIME: 2100  Subjective:  30 y o  Mp Gomez at 38w4d complaining of contractions since yesterday  Patient has membrane stripe today thereafter her contractions intensity increased  She declined vaginal bleeding, fluid leakage, reporting good fetal movements  Patient pregnancy complicated by gestational diabetes which is controlled with insulin        Objective:  Vitals:    19   BP:    Pulse:    Resp:    Temp: 98 7 °F (37 1 °C)       SVE: 3 / 70% / -2 repeat 3/70/-2  FHT:  130 / Moderate 6 - 25 bpm / 15x15 accels and no decels , reactive  [place]: q 3-6   D/w Dr Chrissie Lazaro MD  03:85 PM

## 2019-12-27 NOTE — L&D DELIVERY NOTE
Delivery Summary - OB/GYN   Maine Stuart 30 y o  female MRN: [id number]  Unit/Bed#: L&D 326-01 Encounter: [id number]    Pre-delivery Diagnosis:   1  38w5d pregnancy  2  A2GDM  3  Hx of Preeclampsia    Post-delivery Diagnosis: same, delivered    Attending: Dr Isatu Galarza    Assistant(s): Dr Abigail Steiner    Procedure:     Anesthesia: epidural    Estimated Blood Loss:   mL    Specimens:   1  Arterial and venous cord gases  2  Cord blood  3  Segment of umbilical cord  4  Placenta to pathology    Complications:  None apparent    Findings:  1  Viable male  delivered on 19 at 0221 weighing pending lbs pending oz;  Apgar scores of 9 at one minute and 9 at five minutes  2  Spontaneous delivery of placenta with centrally inserted 3-vessel cord  3  2nd degree perineal laceration, repaired with 3-0Vicryl rapid       Disposition: Patient tolerated the procedure well and was recovering in labor and delivery room with family and  before being transferred to the post-partum floor  Procedure Details     Description of procedure    After pushing for 1 minutes, on 19 at 0218 patient delivered a viable male , weighing pending g, Apgars of 9 (1 min) and 9 (5 min)  The fetal vertex delivered spontaneously  There was no nuchal cord  The anterior shoulder delivered atraumatically with maternal expulsive forces and the assistance of downward traction  The posterior shoulder delivered with maternal expulsive forces and the assistance of upward traction  The remainder of the fetus delivered spontaneously  Upon delivery, the infant was placed on the mothers abdomen and the cord was clamped and cut  The infant was noted to cry spontaneously and was moving all extremities appropriately  There was no evidence for injury  Awaiting nurse resuscitators evaluated the  at bedside  Arterial and venous cord blood gases and cord blood was collected for analysis   These were promptly sent to the lab  In the immediate post-partum, 30 units of IV pitocin was administered and the uterus was noted to contract down well with massage and pitocin  The placenta delivered spontaneously at 0227 and was noted to have a centrally inserted 3 vessel cord  The vagina, cervix, and perineum were inspected and there was noted to be a second-degree perineal laceration  Laceration Repair  Patient was comfortable with epidural at that time  A second-degree perineal laceration was identified and required repair  Laceration was repaired with 3-0 Vicryl rapid in good to reapproximate the laceration  Good hemostasis was confirmed at the conclusion of this procedure  At the conclusion of the delivery, all needle, sponge, and instrument counts were noted to be correct  Patient tolerated the procedure well and was allowed to recover in labor and delivery room with family and  before being transferred to the post-partum floor  Dr Nkechi Hoover was present and participated in all key portions of the case      Gloria Moses MD  OBGYN, PGY-2  2019  3:00 AM

## 2019-12-27 NOTE — DISCHARGE SUMMARY
Discharge Summary - OB/GYN   Applegate Contes Autumn Burley Cranker 30 y o  female MRN: [id number]  Unit/Bed#: L&D 326-01 Encounter: [id number]      Admission Date: 2019     Discharge Date: 19    Admitting Diagnosis:   1  Pregnancy at 38w5d  2  A2GDM  3  Hx Preeclampsia    Discharge Diagnosis:   Same, delivered    Procedures: spontaneous vaginal delivery, second degree laceration repair    Attending: Trupti Montes De Oca MD, Samir Mulligan MD    Hospital Course:     Michael Aguilera is a 30 y o  Tivis Meredith at 38w5d wks who was initially admitted for labor  She delivered a viable male  on 2019 at 0221  Weight 6lbs 11oz via spontaneous vaginal delivery  Apgars were 9 (1 min) and 9 (5 min)   was transferred to  nursery  Patient tolerated the procedure well and was transferred to recovery in stable condition  Her post-partum course was uncomplicated  Her post-partum pain was well controlled with oral analgesics  On day of discharge, she was ambulating and able to reasonably perform all ADLs  She was voiding and had appropriate bowel function  Pain was well controlled  She was discharged home on post-partum day #1 without complications  Patient was instructed to follow up with her OB as an outpatient and was given appropriate warnings to call provider if she develops signs of infection or uncontrolled pain  Complications: none apparent    Condition at discharge: stable     Discharge instructions/Information to patient and family:   See after visit summary for information provided to patient and family  Provisions for Follow-Up Care:  See after visit summary for information related to follow-up care and any pertinent home health orders  Disposition: Home    Planned Readmission: No    Discharge Medications:  Prenatal vitamin daily for 6 months or the duration of nursing whichever is longer    Motrin 600 mg orally every 6 hours as needed for pain  Tylenol (over the counter) per bottle directions as needed for pain  Witch hazel pads for hemorrhoidal discomfort as needed      Bere Lafleur MD

## 2019-12-27 NOTE — PLAN OF CARE
Problem: BIRTH - VAGINAL/ SECTION  Goal: Fetal and maternal status remain reassuring during the birth process  Description  INTERVENTIONS:  - Monitor vital signs  - Monitor fetal heart rate  - Monitor uterine activity  - Monitor labor progression (vaginal delivery)  - DVT prophylaxis  - Antibiotic prophylaxis  Outcome: Progressing  Goal: Emotionally satisfying birthing experience for mother/fetus  Description  Interventions:  - Assess, plan, implement and evaluate the nursing care given to the patient in labor  - Advocate the philosophy that each childbirth experience is a unique experience and support the family's chosen level of involvement and control during the labor process   - Actively participate in both the patient's and family's teaching of the birth process  - Consider cultural, Spiritism and age-specific factors and plan care for the patient in labor  Outcome: Progressing     Problem: Labor & Delivery  Goal: Manages discomfort  Description  Assess and monitor for signs and symptoms of discomfort  Assess patient's pain level regularly and per hospital policy  Administer medications as ordered  Support use of nonpharmacological methods to help control pain such as distraction, imagery, relaxation, and application of heat and cold  Collaborate with interdisciplinary team and patient to determine appropriate pain management plan  1  Include patient in decisions related to comfort  2  Offer non-pharmacological pain management interventions  3  Report ineffective pain management to physician  Outcome: Progressing  Goal: Patient vital signs are stable  Description  1  Assess vital signs - vaginal delivery    Outcome: Progressing

## 2019-12-28 VITALS
WEIGHT: 165 LBS | OXYGEN SATURATION: 98 % | RESPIRATION RATE: 16 BRPM | TEMPERATURE: 97.6 F | SYSTOLIC BLOOD PRESSURE: 108 MMHG | BODY MASS INDEX: 32.39 KG/M2 | DIASTOLIC BLOOD PRESSURE: 70 MMHG | HEART RATE: 90 BPM | HEIGHT: 60 IN

## 2019-12-28 PROCEDURE — 99024 POSTOP FOLLOW-UP VISIT: CPT | Performed by: OBSTETRICS & GYNECOLOGY

## 2019-12-28 RX ORDER — IBUPROFEN 600 MG/1
600 TABLET ORAL EVERY 6 HOURS PRN
Qty: 30 TABLET | Refills: 0
Start: 2019-12-28 | End: 2020-02-13

## 2019-12-28 RX ORDER — ACETAMINOPHEN 325 MG/1
650 TABLET ORAL EVERY 6 HOURS PRN
Qty: 30 TABLET | Refills: 0
Start: 2019-12-28 | End: 2020-02-13

## 2019-12-28 RX ADMIN — DOCUSATE SODIUM 100 MG: 100 CAPSULE, LIQUID FILLED ORAL at 18:17

## 2019-12-28 RX ADMIN — IBUPROFEN 600 MG: 600 TABLET ORAL at 01:30

## 2019-12-28 RX ADMIN — IBUPROFEN 600 MG: 600 TABLET ORAL at 17:02

## 2019-12-28 RX ADMIN — IBUPROFEN 600 MG: 600 TABLET ORAL at 07:50

## 2019-12-28 NOTE — LACTATION NOTE
This note was copied from a baby's chart  Met with mother to go over feeding log since birth for the first week  Emphasized 8 or more (12) feedings in a 24 hour period, what to expect for the number of diapers per day of life and the progression of properties of the  stooling pattern  Discussed s/s that breastfeeding is going well after day 4 and when to get help from a pediatrician or lactation support person after day 4  Booklet included Breast Pumping Instructions, When You Go Back to Work or School, and Breastfeeding Resources for after discharge including access to the number for the [address]  Mother verbalized breastfeeding is going well  Enc to call for assistance as needed,phone # given

## 2019-12-28 NOTE — PROGRESS NOTES
Progress Note - OB/GYN   Tabby Thornton Ip 30 y o  female MRN: [id number]  Unit/Bed#: L&D [facility] Encounter: [id number]    Assessment:  Post partum Day #1 s/p , stable, baby in room    Plan:  1) A2 GDM   2 hour GTT in 4-12 weeks postpartum  2) Continue routine post partum care   Encourage ambulation   Encourage breastfeeding   Anticipate discharge today     Subjective/Objective   Chief Complaint:     Post delivery  Patient is doing well  Lochia WNL  Pain well controlled  Desires early discharge  Subjective:     Pain: yes, cramping, improved with meds  Tolerating PO: yes  Voiding: yes  Flatus: yes  Ambulating: yes  Chest pain: no  Shortness of breath: no  Leg pain: no  Lochia: minimal    Objective:     Vitals: /68 (BP Location: Right arm)   Pulse 104   Temp 97 5 °F (36 4 °C) (Oral)   Resp 18   Ht 5' (1 524 m)   Wt 74 8 kg (165 lb)   LMP 2019 (Exact Date)   SpO2 98%   Breastfeeding? Yes   BMI 32 22 kg/m²       Intake/Output Summary (Last 24 hours) at 2019 0725  Last data filed at 2019 1043  Gross per 24 hour   Intake --   Output 400 ml   Net -400 ml       Lab Results   Component Value Date    WBC 19 67 (H) 2019    HGB 14 4 2019    HCT 43 2 2019    MCV 86 2019     2019       Physical Exam:     Gen: AAOx3, NAD  CV: RRR  Lungs: CTA b/l  Abd: Soft, non-tender, non-distended, no rebound or guarding  Uterine fundus firm and non-tender, at the umbilicus     Ext: Non tender, warm, 2+ DP pulses b/l    Vanessa Warner MD  2019  7:25 AM

## 2019-12-28 NOTE — LACTATION NOTE
This note was copied from a baby's chart  Mom requesting assitance waking/latching baby  Waking strategies reviewed with demo  Assisted mom to place baby skin to skin in football hold on left breast   Baby starting to show early feeding cues  Encouraged mom to maintain skin to skin and attempt to feed baby  Encouraged parents to call for assistance, questions, and concerns about breastfeeding  Extension provided

## 2019-12-28 NOTE — PLAN OF CARE
Problem: DISCHARGE PLANNING - CARE MANAGEMENT  Goal: Discharge to post-acute care or home with appropriate resources  Description  INTERVENTIONS:  - Conduct assessment to determine patient/family and health care team treatment goals, and need for post-acute services based on payer coverage, community resources, and patient preferences, and barriers to discharge  - Address psychosocial, clinical, and financial barriers to discharge as identified in assessment in conjunction with the patient/family and health care team  - Arrange appropriate level of post-acute services according to patients   needs and preference and payer coverage in collaboration with the physician and health care team  - Communicate with and update the patient/family, physician, and health care team regarding progress on the discharge plan  - Arrange appropriate transportation to post-acute venues  Outcome: Progressing     Problem: POSTPARTUM  Goal: Experiences normal postpartum course  Description  INTERVENTIONS:  - Monitor maternal vital signs  - Assess uterine involution and lochia  Outcome: Progressing  Goal: Appropriate maternal -  bonding  Description  INTERVENTIONS:  - Identify family support  - Assess for appropriate maternal/infant bonding   -Encourage maternal/infant bonding opportunities  - Referral to  or  as needed  Outcome: Progressing  Goal: Establishment of infant feeding pattern  Description  INTERVENTIONS:  - Assess breast/bottle feeding  - Refer to lactation as needed  Outcome: Progressing  Goal: Incision(s), wounds(s) or drain site(s) healing without S/S of infection  Description  INTERVENTIONS  - Assess and document risk factors for skin impairment   - Assess and document dressing, incision, wound bed, drain sites and surrounding tissue  - Consider nutrition services referral as needed  - Oral mucous membranes remain intact  - Provide patient/ family education  Outcome: Progressing Problem: PAIN - ADULT  Goal: Verbalizes/displays adequate comfort level or baseline comfort level  Description  Interventions:  - Encourage patient to monitor pain and request assistance  - Assess pain using appropriate pain scale  - Administer analgesics based on type and severity of pain and evaluate response  - Implement non-pharmacological measures as appropriate and evaluate response  - Consider cultural and social influences on pain and pain management  - Notify physician/advanced practitioner if interventions unsuccessful or patient reports new pain  Outcome: Progressing     Problem: INFECTION - ADULT  Goal: Absence or prevention of progression during hospitalization  Description  INTERVENTIONS:  - Assess and monitor for signs and symptoms of infection  - Monitor lab/diagnostic results  - Monitor all insertion sites, i e  indwelling lines, tubes, and drains  - Monitor endotracheal if appropriate and nasal secretions for changes in amount and color  - Leigh appropriate cooling/warming therapies per order  - Administer medications as ordered  - Instruct and encourage patient and family to use good hand hygiene technique  - Identify and instruct in appropriate isolation precautions for identified infection/condition  Outcome: Progressing  Goal: Absence of fever/infection during neutropenic period  Description  INTERVENTIONS:  - Monitor WBC    Outcome: Progressing     Problem: SAFETY ADULT  Goal: Patient will remain free of falls  Description  INTERVENTIONS:  - Assess patient frequently for physical needs  -  Identify cognitive and physical deficits and behaviors that affect risk of falls    -  Leigh fall precautions as indicated by assessment   - Educate patient/family on patient safety including physical limitations  - Instruct patient to call for assistance with activity based on assessment  - Modify environment to reduce risk of injury  - Consider OT/PT consult to assist with strengthening/mobility  Outcome: Progressing  Goal: Maintain or return to baseline ADL function  Description  INTERVENTIONS:  -  Assess patient's ability to carry out ADLs; assess patient's baseline for ADL function and identify physical deficits which impact ability to perform ADLs (bathing, care of mouth/teeth, toileting, grooming, dressing, etc )  - Assess/evaluate cause of self-care deficits   - Assess range of motion  - Assess patient's mobility; develop plan if impaired  - Assess patient's need for assistive devices and provide as appropriate  - Encourage maximum independence but intervene and supervise when necessary  - Involve family in performance of ADLs  - Assess for home care needs following discharge   - Consider OT consult to assist with ADL evaluation and planning for discharge  - Provide patient education as appropriate  Outcome: Progressing  Goal: Maintain or return mobility status to optimal level  Description  INTERVENTIONS:  - Assess patient's baseline mobility status (ambulation, transfers, stairs, etc )    - Identify cognitive and physical deficits and behaviors that affect mobility  - Identify mobility aids required to assist with transfers and/or ambulation (gait belt, sit-to-stand, lift, walker, cane, etc )  - [place] fall precautions as indicated by assessment  - Record patient progress and toleration of activity level on Mobility SBAR; progress patient to next Phase/Stage  - Instruct patient to call for assistance with activity based on assessment  - Consider rehabilitation consult to assist with strengthening/weightbearing, etc   Outcome: Progressing     Problem: Knowledge Deficit  Goal: Patient/family/caregiver demonstrates understanding of disease process, treatment plan, medications, and discharge instructions  Description  Complete learning assessment and assess knowledge base    Interventions:  - Provide teaching at level of understanding  - Provide teaching via preferred learning methods  Outcome: Progressing     Problem: DISCHARGE PLANNING  Goal: Discharge to home or other facility with appropriate resources  Description  INTERVENTIONS:  - Identify barriers to discharge w/patient and caregiver  - Arrange for needed discharge resources and transportation as appropriate  - Identify discharge learning needs (meds, wound care, etc )  - Arrange for interpretive services to assist at discharge as needed  - Refer to Case Management Department for coordinating discharge planning if the patient needs post-hospital services based on physician/advanced practitioner order or complex needs related to functional status, cognitive ability, or social support system  Outcome: Progressing

## 2019-12-31 ENCOUNTER — TELEPHONE (OUTPATIENT)
Dept: OBGYN CLINIC | Facility: CLINIC | Age: 29
End: 2019-12-31

## 2019-12-31 RX ORDER — BREAST PUMP
EACH MISCELLANEOUS ONCE
Qty: 1 EACH | Refills: 0 | Status: SHIPPED | OUTPATIENT
Start: 2019-12-31 | End: 2019-12-31

## 2020-02-13 ENCOUNTER — POSTPARTUM VISIT (OUTPATIENT)
Dept: OBGYN CLINIC | Facility: CLINIC | Age: 30
End: 2020-02-13

## 2020-02-13 VITALS
BODY MASS INDEX: 30.08 KG/M2 | SYSTOLIC BLOOD PRESSURE: 142 MMHG | OXYGEN SATURATION: 97 % | HEART RATE: 90 BPM | WEIGHT: 154 LBS | DIASTOLIC BLOOD PRESSURE: 98 MMHG

## 2020-02-13 DIAGNOSIS — Z30.09 CONTRACEPTIVE EDUCATION: ICD-10-CM

## 2020-02-13 PROBLEM — O24.414 INSULIN CONTROLLED GESTATIONAL DIABETES MELLITUS (GDM) IN THIRD TRIMESTER: Status: RESOLVED | Noted: 2019-10-21 | Resolved: 2020-02-13

## 2020-02-13 PROCEDURE — 99024 POSTOP FOLLOW-UP VISIT: CPT | Performed by: OBSTETRICS & GYNECOLOGY

## 2020-02-13 NOTE — PROGRESS NOTES
Patient is a 30 y o  [id number] with Patient's last menstrual period was 2019 (exact date)  who presents for post partum examination s/p  on 2019  Pt is without complaints  She reports overall she is feeling well  She reports her lochia has stopped and she has not had a menses as of yet  Pt reports she is Breast feeding  Pt denies si/sx of ppd and scored a 3 on her ppd-E today  Pt reports she has not been sexually active as of yet   She desires Mirena for contraception    Past Medical History:   Diagnosis Date    Gestational diabetes 10/21/2019    GDMA2    Gestational hypertension     during 3rd trimester, induced    History of pre-eclampsia in prior pregnancy, currently pregnant in second trimester 2019    Insulin controlled gestational diabetes mellitus (GDM) in third trimester 10/21/2019    Need for HPV vaccination     completed series    Varicella     Visual impairment        Past Surgical History:   Procedure Laterality Date    WISDOM TOOTH EXTRACTION         OB History    Para Term  AB Living   2 2 2     2   SAB TAB Ectopic Multiple Live Births         0 2      # Outcome Date GA Lbr George/2nd Weight Sex Delivery Anes PTL Lv   2 Term 19 38w5d / 00:04 3020 g (6 lb 10 5 oz) M Vag-Spont None N JOE   1 Term 17 38w4d / 00:44 2977 g (6 lb 9 oz) M Vag-Spont EPI N JOE      Obstetric Comments   Menarche: 10   25-28/4/regular tampon every 6 hours           Current Outpatient Medications:     Prenatal MV-Min-Fe Fum-FA-DHA (PRENATAL 1 PO), Take 1 tablet by mouth daily, Disp: , Rfl:     No Known Allergies    Social History     Socioeconomic History    Marital status: /Civil Union     Spouse name: Dolores Thorpe Number of children: 1    Years of education: bachelor's    Highest education level: None   Occupational History    Occupation: clincal analyst   Social Needs    Financial resource strain: None    Food insecurity:     Worry: None     Inability: None    Transportation needs:     Medical: None     Non-medical: None   Tobacco Use    Smoking status: Never Smoker    Smokeless tobacco: Never Used   Substance and Sexual Activity    Alcohol use: Not Currently     Comment: occasionally ; none while pregnant    Drug use: No    Sexual activity: Yes     Partners: Male     Birth control/protection: None     Comment: lifetime partners: 1   Lifestyle    Physical activity:     Days per week: None     Minutes per session: None    Stress: None   Relationships    Social connections:     Talks on phone: None     Gets together: None     Attends Christian service: None     Active member of club or organization: None     Attends meetings of clubs or organizations: None     Relationship status: None    Intimate partner violence:     Fear of current or ex partner: None     Emotionally abused: None     Physically abused: None     Forced sexual activity: None   Other Topics Concern    None   Social History Narrative    Roman Catholic: no preference    Accepts blood products        Exercise: 3x/week elliptical    Calcium: 1 yogurt daily, occ cheese, PNV       Family History   Problem Relation Age of Onset    Hearing loss Father     Colon cancer Father 62    Hearing loss Paternal [de-identified]     Thyroid disease Mother     Thyroid disease Sister     No Known Problems Brother     Transient ischemic attack Maternal Grandmother     Heart failure Maternal Grandfather     Diabetes Maternal Grandfather     No Known Problems Paternal Grandmother     Heart failure Paternal Grandfather     Diabetes Paternal Grandfather        Review of Systems   Constitutional: Negative for chills, fatigue, fever and unexpected weight change  HENT: Negative for congestion, mouth sores and sore throat  Respiratory: Negative for cough, chest tightness, shortness of breath and wheezing  Cardiovascular: Negative for chest pain and palpitations     Gastrointestinal: Negative for abdominal distention, abdominal pain, constipation, diarrhea, nausea and vomiting  Endocrine: Negative for cold intolerance and heat intolerance  Genitourinary: Negative for dyspareunia, dysuria, genital sores, menstrual problem, pelvic pain, vaginal bleeding, vaginal discharge and vaginal pain  Musculoskeletal: Negative for arthralgias  Skin: Negative for color change and rash  Neurological: Negative for dizziness, light-headedness and headaches  Hematological: Negative for adenopathy  Blood pressure 142/98, pulse 90, weight 69 9 kg (154 lb), last menstrual period 03/31/2019, SpO2 97 %, currently breastfeeding  and Body mass index is 30 08 kg/m²  Physical Exam   Constitutional: She is oriented to person, place, and time  She appears well-developed and well-nourished  HENT:   Head: Normocephalic and atraumatic  Eyes: Conjunctivae and EOM are normal    Neck: Normal range of motion  Neck supple  No tracheal deviation present  No thyromegaly present  Cardiovascular: Normal rate, regular rhythm and normal heart sounds  Pulmonary/Chest: Effort normal and breath sounds normal  No stridor  No respiratory distress  She has no wheezes  She has no rales  Abdominal: Soft  Bowel sounds are normal  She exhibits no distension and no mass  There is no tenderness  There is no rebound and no guarding  Musculoskeletal: Normal range of motion  She exhibits no edema or tenderness  Lymphadenopathy:     She has no cervical adenopathy  Neurological: She is alert and oriented to person, place, and time  Skin: Skin is warm  No rash noted  No erythema  Psychiatric: She has a normal mood and affect   Her behavior is normal  Judgment and thought content normal      Breasts: breasts appear normal, no suspicious masses, no skin or nipple changes or axillary nodes, lactating, no erythema or tenderness, nipples normal      vulva: normal external genitalia for age and no lesions, masses, epithelial changes, or exudate  vagina: color red and rugae  flattening of rugae  cervix: parous and no lesions   uterus: NSSC, AF, NT, mobile  adnexa: no masses or tenderness      A/P:  Pt is a 30 y o  [id number] with      Diagnoses and all orders for this visit:    Postpartum examination following vaginal delivery  -normal exam, doing well  BP elevated, but pt without pre-eclampsia this pregnancy or symptoms at this time  Will monitor as outpatient with pcpc    Gestational diabetes mellitus (GDM), delivered  -     Glucose THERESA 2HR 75GM Nonpreg;  Future    Contraceptive education    desires Mirena--will return for insertion

## 2021-04-06 ENCOUNTER — ANNUAL EXAM (OUTPATIENT)
Dept: OBGYN CLINIC | Facility: CLINIC | Age: 31
End: 2021-04-06
Payer: COMMERCIAL

## 2021-04-06 VITALS
WEIGHT: 160 LBS | DIASTOLIC BLOOD PRESSURE: 86 MMHG | OXYGEN SATURATION: 98 % | HEART RATE: 86 BPM | BODY MASS INDEX: 31.41 KG/M2 | SYSTOLIC BLOOD PRESSURE: 130 MMHG | HEIGHT: 60 IN

## 2021-04-06 DIAGNOSIS — Z30.09 ENCOUNTER FOR GENERAL COUNSELING AND ADVICE ON CONTRACEPTIVE MANAGEMENT: ICD-10-CM

## 2021-04-06 DIAGNOSIS — Z01.419 ENCOUNTER FOR GYNECOLOGICAL EXAMINATION WITHOUT ABNORMAL FINDING: Primary | ICD-10-CM

## 2021-04-06 PROCEDURE — S0612 ANNUAL GYNECOLOGICAL EXAMINA: HCPCS | Performed by: NURSE PRACTITIONER

## 2021-04-06 RX ORDER — ACETAMINOPHEN AND CODEINE PHOSPHATE 120; 12 MG/5ML; MG/5ML
1 SOLUTION ORAL DAILY
Qty: 84 TABLET | Refills: 4 | Status: SHIPPED | OUTPATIENT
Start: 2021-04-06

## 2021-04-06 NOTE — PATIENT INSTRUCTIONS
Start pill with the onset next menstrual period  Take it same time daily  Follow up regarding blood pressure

## 2023-10-17 NOTE — PROGRESS NOTES
Assessment / Plan    1  Encounter for gynecological examination without abnormal finding  Normal well woman exam  2019 pap/hpv negative  Repeat      2  Encounter for general counseling and advice on contraceptive management  BP reading is borderline  Advised patient to follow up with PCP  Reviewed all progestin only methods of contraception and she chooses the pill to start  RX sent, reviewed instructions for initiating     - norethindrone (MICRONOR) 0 35 MG tablet; Take 1 tablet (0 35 mg total) by mouth daily  Dispense: 84 tablet; Refill: 4        Subjective      Ruthann Woods is a 26 y o  female who presents for her annual gynecologic exam     Doing well, no complaints  Last pap: 2019 pap/hpv negative  Uses spin bike 30-60 mins at least 5x per week  Currently using condoms for birth control  Considering resuming pill  Periods are regular   Current contraception: condoms  History of abnormal Pap smear: no  Family history of breast,uterine, ovarian or colon cancer: yes - pat great GM with breast, father with colon  Menstrual History:  OB History        2    Para   2    Term   2            AB        Living   2       SAB        TAB        Ectopic        Multiple   0    Live Births   2           Obstetric Comments   Menarche: 10  -/regular tampon every 6 hours              Patient's last menstrual period was 2021 (exact date)  The following portions of the patient's history were reviewed and updated as appropriate: allergies, current medications, past family history, past medical history, past social history, past surgical history and problem list     Review of Systems      Review of Systems   Constitutional: Negative for chills and fever  Respiratory: Negative for cough and shortness of breath  Gastrointestinal: Negative for abdominal distention, abdominal pain, blood in stool, constipation, diarrhea, nausea and vomiting     Genitourinary: Negative for Levels changed and sent to [facility]   difficulty urinating, dysuria, frequency, genital sores, hematuria, menstrual problem, pelvic pain, urgency, vaginal bleeding and vaginal discharge  Musculoskeletal: Negative for arthralgias and myalgias  Breasts:  Negative for skin changes, dimpling, asymmetry, nipple discharge, redness, tenderness or palpable masses    Objective      /86 (BP Location: Left arm, Patient Position: Sitting, Cuff Size: Standard)   Pulse 86   Ht 5' (1 524 m)   Wt 72 6 kg (160 lb)   LMP 03/23/2021 (Exact Date)   SpO2 98%   BMI 31 25 kg/m²   Physical Exam  Constitutional:       General: She is not in acute distress  Appearance: Normal appearance  She is well-developed  She is not ill-appearing or diaphoretic  Comments: BMI 31 25   HENT:      Head: Normocephalic and atraumatic  Eyes:      Pupils: Pupils are equal, round, and reactive to light  Neck:      Musculoskeletal: Neck supple  Thyroid: No thyromegaly  Pulmonary:      Effort: Pulmonary effort is normal    Chest:      Breasts: Breasts are symmetrical          Right: No inverted nipple, mass, nipple discharge, skin change or tenderness  Left: No inverted nipple, mass, nipple discharge, skin change or tenderness  Abdominal:      General: There is no distension  Palpations: Abdomen is soft  There is no mass  Tenderness: There is no abdominal tenderness  There is no guarding or rebound  Genitourinary:     General: Normal vulva  Exam position: Lithotomy position  Labia:         Right: No rash, tenderness, lesion or injury  Left: No rash, tenderness, lesion or injury  Vagina: No signs of injury and foreign body  No vaginal discharge, erythema, tenderness or bleeding  Cervix: No cervical motion tenderness, discharge or friability  Uterus: Not enlarged and not tender  Adnexa:         Right: No mass or tenderness  Left: No mass or tenderness       Lymphadenopathy:      Cervical: No cervical adenopathy  Upper Body:      Right upper body: No supraclavicular adenopathy  Left upper body: No supraclavicular adenopathy  Skin:     General: Skin is warm and dry  Neurological:      General: No focal deficit present  Mental Status: She is alert and oriented to person, place, and time  Psychiatric:         Mood and Affect: Mood normal          Behavior: Behavior normal          Thought Content:  Thought content normal          Judgment: Judgment normal

## 2024-07-03 ENCOUNTER — OFFICE VISIT (OUTPATIENT)
Dept: OBGYN CLINIC | Facility: CLINIC | Age: 34
End: 2024-07-03
Payer: COMMERCIAL

## 2024-07-03 VITALS
DIASTOLIC BLOOD PRESSURE: 72 MMHG | BODY MASS INDEX: 26 KG/M2 | OXYGEN SATURATION: 100 % | HEIGHT: 60 IN | WEIGHT: 132.4 LBS | HEART RATE: 100 BPM | SYSTOLIC BLOOD PRESSURE: 114 MMHG

## 2024-07-03 DIAGNOSIS — Z01.419 ENCOUNTER FOR ANNUAL ROUTINE GYNECOLOGICAL EXAMINATION: ICD-10-CM

## 2024-07-03 DIAGNOSIS — Z12.4 SCREENING FOR CERVICAL CANCER: Primary | ICD-10-CM

## 2024-07-03 PROBLEM — O99.213 MATERNAL OBESITY, ANTEPARTUM, THIRD TRIMESTER: Status: RESOLVED | Noted: 2019-08-22 | Resolved: 2024-07-03

## 2024-07-03 PROCEDURE — G0145 SCR C/V CYTO,THINLAYER,RESCR: HCPCS | Performed by: OBSTETRICS & GYNECOLOGY

## 2024-07-03 PROCEDURE — G0476 HPV COMBO ASSAY CA SCREEN: HCPCS | Performed by: OBSTETRICS & GYNECOLOGY

## 2024-07-03 PROCEDURE — S0610 ANNUAL GYNECOLOGICAL EXAMINA: HCPCS | Performed by: OBSTETRICS & GYNECOLOGY

## 2024-07-03 RX ORDER — SPIRONOLACTONE 50 MG/1
TABLET, FILM COATED ORAL
COMMUNITY
Start: 2024-04-17

## 2024-07-03 NOTE — PROGRESS NOTES
Subjective      Ruthann Mora is a 33 y.o. female who presents for annual exam.      Chief Complaint   Patient presents with    New Patient Visit     Est care, yearly      [facility]   Had noticed some mid cycle spotting lasting 3 days, since starting aldactone. Still regular cycles otherwise, 25 days   No other concerns  Was in [place] for vacation recently       Last Pap: Not on file 2019 NILM/HPV neg   Last mammogram: Not on file      HPV vaccine completed:yes   Current contraception: vasectomy  History of abnormal Pap smear: no  History of abnormal mammogram: no      Family history of uterine or ovarian cancer: no  Family history of breast cancer: yes - paternal GM, dx recently 87 y/o   Family history of colon cancer: yes - father, 56 y/o       Menstrual History:  OB History          2    Para   2    Term   2            AB        Living   2         SAB        IAB        Ectopic        Multiple   0    Live Births   2           Obstetric Comments   Menarche: 10  25-28//regular tampon every 6 hours                  Patient's last menstrual period was 2024 (exact date).         Past Medical History:   Diagnosis Date    Gestational diabetes 10/21/2019    GDMA2    Gestational hypertension     during 3rd trimester, induced    History of pre-eclampsia in prior pregnancy, currently pregnant in second trimester 2019    Insulin controlled gestational diabetes mellitus (GDM) in third trimester 10/21/2019    Need for HPV vaccination     completed series    Varicella     Visual impairment      Past Surgical History:   Procedure Laterality Date    WISDOM TOOTH EXTRACTION       Family History   Problem Relation Age of Onset    Thyroid disease Mother     Hearing loss Father     Colon cancer Father 57    Thyroid disease Sister     No Known Problems Brother     Transient ischemic attack Maternal Grandmother     Heart failure Maternal Grandfather     Diabetes  Maternal Grandfather     Breast cancer Paternal Grandmother     Heart failure Paternal Grandfather     Diabetes Paternal Grandfather     Hearing loss Paternal Aunt     Breast cancer Other     Ovarian cancer Neg Hx     Uterine cancer Neg Hx        Social History     Tobacco Use    Smoking status: Never    Smokeless tobacco: Never   Vaping Use    Vaping status: Never Used   Substance Use Topics    Alcohol use: Yes     Comment: occasionally ; none while pregnant    Drug use: No          Current Outpatient Medications:     Ferrous Sulfate (IRON PO), , Disp: , Rfl:     spironolactone (ALDACTONE) 50 mg tablet, , Disp: , Rfl:     No Known Allergies        Review of Systems   Constitutional:  Negative for appetite change, chills and fever.   Eyes:  Negative for visual disturbance.   Respiratory:  Negative for cough, chest tightness and shortness of breath.    Cardiovascular:  Negative for chest pain.   Gastrointestinal:  Negative for abdominal distention, abdominal pain, constipation, diarrhea, nausea and vomiting.   Endocrine: Negative for cold intolerance and heat intolerance.   Genitourinary:  Negative for difficulty urinating, dyspareunia, dysuria, frequency, genital sores, pelvic pain, urgency, vaginal bleeding, vaginal discharge and vaginal pain.   Musculoskeletal:  Negative for arthralgias.   Neurological:  Negative for light-headedness and headaches.   Hematological:  Does not bruise/bleed easily.   Psychiatric/Behavioral:  Negative for behavioral problems.    All other systems reviewed and are negative.      /72 (BP Location: Right arm, Patient Position: Sitting, Cuff Size: Adult)   Pulse 100   Ht 5' (1.524 m)   Wt 60.1 kg (132 lb 6.4 oz)   LMP 06/26/2024 (Exact Date)   SpO2 100%   BMI 25.86 kg/m²         Physical Exam  Constitutional:       General: She is not in acute distress.     Appearance: Normal appearance.   Genitourinary:      Vulva, bladder and urethral meatus normal.      No lesions in the  vagina.      Right Labia: No rash, tenderness, lesions or skin changes.     Left Labia: No tenderness, lesions, skin changes or rash.     No labial fusion noted.      No inguinal adenopathy present in the right or left side.     No vaginal discharge, erythema, tenderness, bleeding or ulceration.      No vaginal prolapse present.       Right Adnexa: not tender, not full and no mass present.     Left Adnexa: not tender, not full and no mass present.     No cervical motion tenderness, discharge, friability, lesion or polyp.      Uterus is not enlarged, fixed, tender or irregular.      No uterine mass detected.     Uterus is anteverted.      Pelvic exam was performed with patient in the lithotomy position.   Breasts:     Right: No swelling, bleeding, inverted nipple, mass, nipple discharge, skin change or tenderness.      Left: No swelling, bleeding, inverted nipple, mass, nipple discharge, skin change or tenderness.   HENT:      Head: Normocephalic and atraumatic.   Neck:      Thyroid: No thyromegaly.   Cardiovascular:      Rate and Rhythm: Normal rate and regular rhythm.   Pulmonary:      Effort: Pulmonary effort is normal. No accessory muscle usage or respiratory distress.   Abdominal:      General: There is no distension.      Palpations: Abdomen is soft.      Tenderness: There is no abdominal tenderness. There is no guarding or rebound.   Musculoskeletal:         General: Normal range of motion.      Cervical back: Normal range of motion and neck supple.   Lymphadenopathy:      Upper Body:      Right upper body: No supraclavicular or axillary adenopathy.      Left upper body: No supraclavicular or axillary adenopathy.      Lower Body: No right inguinal and no right inguinal adenopathy. No left inguinal and no left inguinal adenopathy.   Neurological:      General: No focal deficit present.      Mental Status: She is alert.   Skin:     General: Skin is warm and dry.      Findings: No erythema.   Psychiatric:          Mood and Affect: Mood normal.         Behavior: Behavior normal.   Vitals and nursing note reviewed. Exam conducted with a chaperone present.             Encounter for annual routine gynecological examination  - Discussed ACOG guidelines for pap smear screening frequency: performed today  - Discussed healthy lifestyle recommendations for diet, exercise and self breast awareness.  - Discussed ACOG recommendations for screening mammograms: not indicated today.  - Discussed age based recommendations for adequate calcium and vitamin D intake. No additional osteoporosis screening indicated at this time.  - Discussed ACOG recommendations for colon cancer screening: not indicated at this time.  - Safe sex practices were discussed and STI testing was not desired by the patient  - Contraceptive options were reviewed: vasectomy   - Routine follow up in 1 year was recommended or sooner as needed. All questions and concerns were addressed.

## 2024-07-03 NOTE — ASSESSMENT & PLAN NOTE
- Discussed ACOG guidelines for pap smear screening frequency: performed today  - Discussed healthy lifestyle recommendations for diet, exercise and self breast awareness.  - Discussed ACOG recommendations for screening mammograms: not indicated today.  - Discussed age based recommendations for adequate calcium and vitamin D intake. No additional osteoporosis screening indicated at this time.  - Discussed ACOG recommendations for colon cancer screening: not indicated at this time.  - Safe sex practices were discussed and STI testing was not desired by the patient  - Contraceptive options were reviewed: vasectomy   - Routine follow up in 1 year was recommended or sooner as needed. All questions and concerns were addressed.

## 2024-07-05 LAB
HPV HR 12 DNA CVX QL NAA+PROBE: NEGATIVE
HPV16 DNA CVX QL NAA+PROBE: NEGATIVE
HPV18 DNA CVX QL NAA+PROBE: NEGATIVE

## 2024-07-11 LAB
LAB AP GYN PRIMARY INTERPRETATION: NORMAL
Lab: NORMAL

## 2024-08-02 PROBLEM — Z01.419 ENCOUNTER FOR ANNUAL ROUTINE GYNECOLOGICAL EXAMINATION: Status: RESOLVED | Noted: 2024-07-03 | Resolved: 2024-08-02

## 2025-07-07 ENCOUNTER — ANNUAL EXAM (OUTPATIENT)
Dept: OBGYN CLINIC | Facility: CLINIC | Age: 35
End: 2025-07-07
Payer: COMMERCIAL

## 2025-07-07 VITALS
DIASTOLIC BLOOD PRESSURE: 70 MMHG | SYSTOLIC BLOOD PRESSURE: 104 MMHG | HEIGHT: 60 IN | WEIGHT: 145.4 LBS | BODY MASS INDEX: 28.54 KG/M2 | HEART RATE: 83 BPM

## 2025-07-07 DIAGNOSIS — Z01.419 WOMEN'S ANNUAL ROUTINE GYNECOLOGICAL EXAMINATION: Primary | ICD-10-CM

## 2025-07-07 DIAGNOSIS — R10.2 PELVIC PAIN: ICD-10-CM

## 2025-07-07 PROBLEM — O09.293 HISTORY OF PRE-ECLAMPSIA IN PRIOR PREGNANCY, CURRENTLY PREGNANT IN THIRD TRIMESTER: Status: RESOLVED | Noted: 2019-12-05 | Resolved: 2025-07-07

## 2025-07-07 PROCEDURE — S0612 ANNUAL GYNECOLOGICAL EXAMINA: HCPCS | Performed by: OBSTETRICS & GYNECOLOGY

## 2025-07-07 NOTE — PROGRESS NOTES
Subjective      Ruthann Mora is a 34 y.o. female who presents for annual exam.      Chief Complaint   Patient presents with    Gynecologic Exam     C/O discomfort on the left side before period and when ovulating x 4-5 months     Left pulling sensation, worse with laying down, not with intercourse/activity   Tends to feel feel during ovulation and for time leading up to menses   Last few months   Never severe pain, more discomfort   Cycles somewhat shorter, 21-24 days, menses are lighter overall, only bleeding 2 days at a time   Still on aldactone for acne       Last Pap: 2024 NILM/HPV neg       HPV vaccine completed:yes   Current contraception: vasectomy  History of abnormal Pap smear: no  History of abnormal mammogram: no      Family history of uterine or ovarian cancer: no  Family history of breast cancer: yes - paternal GM, dx recently 89 y/o   Family history of colon cancer: yes - father, 58 y/o       Menstrual History:  OB History          2    Para   2    Term   2            AB        Living   2         SAB        IAB        Ectopic        Multiple   0    Live Births   2           Obstetric Comments   Menarche: 10  25-28/4/regular tampon every 6 hours                  Patient's last menstrual period was 2025.         Past Medical History[1]  Past Surgical History[2]  Family History[3]    Social History[4]     Current Medications[5]    No Known Allergies        Review of Systems   Constitutional:  Negative for appetite change, chills and fever.   Eyes:  Negative for visual disturbance.   Respiratory:  Negative for cough, chest tightness and shortness of breath.    Cardiovascular:  Negative for chest pain.   Gastrointestinal:  Negative for abdominal distention, abdominal pain, constipation, diarrhea, nausea and vomiting.   Endocrine: Negative for cold intolerance and heat intolerance.   Genitourinary:  Positive for pelvic pain. Negative for difficulty urinating, dyspareunia,  dysuria, frequency, genital sores, urgency, vaginal bleeding, vaginal discharge and vaginal pain.   Musculoskeletal:  Negative for arthralgias.   Neurological:  Negative for light-headedness and headaches.   Hematological:  Does not bruise/bleed easily.   Psychiatric/Behavioral:  Negative for behavioral problems.    All other systems reviewed and are negative.      /70 (BP Location: Right arm, Patient Position: Sitting, Cuff Size: Standard)   Pulse 83   Ht 5' (1.524 m)   Wt 66 kg (145 lb 6.4 oz)   LMP 06/18/2025   BMI 28.40 kg/m²         Physical Exam  Constitutional:       General: She is not in acute distress.     Appearance: Normal appearance.   Genitourinary:      Vulva, bladder and urethral meatus normal.      No lesions in the vagina.      Right Labia: No rash, tenderness, lesions or skin changes.     Left Labia: No tenderness, lesions, skin changes or rash.     No labial fusion noted.      No inguinal adenopathy present in the right or left side.     No vaginal discharge, erythema, tenderness, bleeding or ulceration.      No vaginal prolapse present.       Right Adnexa: not tender, not full and no mass present.     Left Adnexa: not tender, not full and no mass present.     No cervical motion tenderness, discharge, friability, lesion or polyp.      Uterus is not enlarged, fixed, tender or irregular.      No uterine mass detected.     Uterus is anteverted.      Pelvic exam was performed with patient in the lithotomy position.   Breasts:     Right: No swelling, bleeding, inverted nipple, mass, nipple discharge, skin change or tenderness.      Left: No swelling, bleeding, inverted nipple, mass, nipple discharge, skin change or tenderness.   HENT:      Head: Normocephalic and atraumatic.   Neck:      Thyroid: No thyromegaly.     Cardiovascular:      Rate and Rhythm: Normal rate and regular rhythm.   Pulmonary:      Effort: Pulmonary effort is normal. No accessory muscle usage or respiratory distress.    Abdominal:      General: There is no distension.      Palpations: Abdomen is soft.      Tenderness: There is no abdominal tenderness. There is no guarding or rebound.     Musculoskeletal:         General: Normal range of motion.      Cervical back: Normal range of motion and neck supple.   Lymphadenopathy:      Upper Body:      Right upper body: No supraclavicular or axillary adenopathy.      Left upper body: No supraclavicular or axillary adenopathy.      Lower Body: No right inguinal and no right inguinal adenopathy. No left inguinal and no left inguinal adenopathy.     Neurological:      General: No focal deficit present.      Mental Status: She is alert.     Skin:     General: Skin is warm and dry.      Findings: No erythema.     Psychiatric:         Mood and Affect: Mood normal.         Behavior: Behavior normal.   Vitals and nursing note reviewed. Exam conducted with a chaperone present.               Pelvic pain  Discomfort/pulling sensation on left side around ovulation/luteal phase. Will check pelvic US. Discussed if US unrevealing could consider hormonal contraception for ovulation suppression - pt to consider     Women's annual routine gynecological examination  - Discussed ACOG guidelines for pap smear screening frequency: not indicated today. Recommend repeat pap smear in [id number]  - Discussed healthy lifestyle recommendations for diet, exercise and self breast awareness.  - Discussed ACOG recommendations for screening mammograms: not indicated today.  - Discussed age based recommendations for adequate calcium and vitamin D intake. No additional osteoporosis screening indicated at this time.  - Discussed ACOG recommendations for colon cancer screening: not indicated at this time.  - Safe sex practices were discussed and STI testing was not desired by the patient  - Contraceptive options were reviewed: vasectomy   - Routine follow up in 1 year was recommended or sooner as needed. All questions and  concerns were addressed.                     [1]   Past Medical History:  Diagnosis Date    Gestational diabetes 10/21/2019    GDMA2    Gestational hypertension     during 3rd trimester, induced    History of pre-eclampsia in prior pregnancy, currently pregnant in second trimester 06/19/2019    Hypertension 1/10/17    end of pregnancy    Insulin controlled gestational diabetes mellitus (GDM) in third trimester 10/21/2019    Need for HPV vaccination     completed series    Varicella     Visual impairment    [2]   Past Surgical History:  Procedure Laterality Date    WISDOM TOOTH EXTRACTION     [3]   Family History  Problem Relation Name Age of Onset    Thyroid disease Mother lu weaknecht     Hearing loss Father Adalberto grijalvanebenny     Colon cancer Father Adalberto grijalvanecht 57    Thyroid disease Sister shannan bledsoe     No Known Problems Brother      Transient ischemic attack Maternal Grandmother      Heart failure Maternal Grandfather glenn grow     Diabetes Maternal Grandfather glenn grow     Breast cancer Paternal Grandmother      Heart failure Paternal Grandfather adalberto grijalvanebenny     Diabetes Paternal Grandfather adalberto grijalvanebenny     Hearing loss Paternal Aunt      Breast cancer Other pat great GM     Ovarian cancer Neg Hx      Uterine cancer Neg Hx     [4]   Social History  Tobacco Use    Smoking status: Never    Smokeless tobacco: Never   Vaping Use    Vaping status: Never Used   Substance Use Topics    Alcohol use: Yes     Alcohol/week: 1.0 standard drink of alcohol     Types: 1 Cans of beer per week     Comment: occasionally ; none while pregnant    Drug use: No   [5]   Current Outpatient Medications:     spironolactone (ALDACTONE) 50 mg tablet, , Disp: , Rfl:

## 2025-07-07 NOTE — ASSESSMENT & PLAN NOTE
Discomfort/pulling sensation on left side around ovulation/luteal phase. Will check pelvic US. Discussed if US unrevealing could consider hormonal contraception for ovulation suppression - pt to consider

## 2025-07-07 NOTE — ASSESSMENT & PLAN NOTE
- Discussed ACOG guidelines for pap smear screening frequency: not indicated today. Recommend repeat pap smear in [id number]  - Discussed healthy lifestyle recommendations for diet, exercise and self breast awareness.  - Discussed ACOG recommendations for screening mammograms: not indicated today.  - Discussed age based recommendations for adequate calcium and vitamin D intake. No additional osteoporosis screening indicated at this time.  - Discussed ACOG recommendations for colon cancer screening: not indicated at this time.  - Safe sex practices were discussed and STI testing was not desired by the patient  - Contraceptive options were reviewed: vasectomy   - Routine follow up in 1 year was recommended or sooner as needed. All questions and concerns were addressed.